# Patient Record
Sex: FEMALE | Race: BLACK OR AFRICAN AMERICAN | NOT HISPANIC OR LATINO | Employment: FULL TIME | URBAN - METROPOLITAN AREA
[De-identification: names, ages, dates, MRNs, and addresses within clinical notes are randomized per-mention and may not be internally consistent; named-entity substitution may affect disease eponyms.]

---

## 2017-01-24 ENCOUNTER — OFFICE VISIT (OUTPATIENT)
Dept: PSYCHIATRY | Facility: CLINIC | Age: 22
End: 2017-01-24
Payer: COMMERCIAL

## 2017-01-24 ENCOUNTER — OFFICE VISIT (OUTPATIENT)
Dept: FAMILY MEDICINE | Facility: CLINIC | Age: 22
End: 2017-01-24
Payer: MEDICAID

## 2017-01-24 VITALS
SYSTOLIC BLOOD PRESSURE: 110 MMHG | OXYGEN SATURATION: 95 % | TEMPERATURE: 98 F | DIASTOLIC BLOOD PRESSURE: 88 MMHG | HEART RATE: 77 BPM | HEIGHT: 68 IN | WEIGHT: 182.56 LBS | BODY MASS INDEX: 27.67 KG/M2

## 2017-01-24 DIAGNOSIS — F41.9 ANXIETY DISORDER, UNSPECIFIED TYPE: ICD-10-CM

## 2017-01-24 DIAGNOSIS — F32.2 MAJOR DEPRESSIVE DISORDER, SEVERE: Primary | ICD-10-CM

## 2017-01-24 DIAGNOSIS — F32.9 SINGLE CURRENT EPISODE OF MAJOR DEPRESSIVE DISORDER, UNSPECIFIED DEPRESSION EPISODE SEVERITY: ICD-10-CM

## 2017-01-24 PROCEDURE — 99213 OFFICE O/P EST LOW 20 MIN: CPT | Mod: PBBFAC,PO | Performed by: FAMILY MEDICINE

## 2017-01-24 PROCEDURE — 99999 PR PBB SHADOW E&M-EST. PATIENT-LVL III: CPT | Mod: PBBFAC,,, | Performed by: FAMILY MEDICINE

## 2017-01-24 PROCEDURE — 90834 PSYTX W PT 45 MINUTES: CPT | Mod: S$GLB,,, | Performed by: SOCIAL WORKER

## 2017-01-24 PROCEDURE — 99214 OFFICE O/P EST MOD 30 MIN: CPT | Mod: S$PBB,,, | Performed by: FAMILY MEDICINE

## 2017-01-24 RX ORDER — PAROXETINE HYDROCHLORIDE 20 MG/1
20 TABLET, FILM COATED ORAL EVERY MORNING
Qty: 90 TABLET | Refills: 5 | Status: SHIPPED | OUTPATIENT
Start: 2017-01-24 | End: 2017-07-18

## 2017-01-24 NOTE — PROGRESS NOTES
Individual Psychotherapy (PhD/LCSW)    1/24/2017    Site:  [facility]         Therapeutic Intervention: Met with patient.  Outpatient - Behavior modifying psychotherapy 45 min - CPT code 63551 and Outpatient - Supportive psychotherapy 45 min - CPT Code 86766    Chief complaint/reason for encounter: depression, anxiety and sleep     Interval history and content of current session: Patient returned to clinic for follow-up psychotherapy. Last seen 11/16. Said that she had difficulty scheduling at a time when she was available. Reported that she has been doing better. Said that she has not had any negative thoughts since she started the medication. Family has noticed a decrease in agitation. Said that there are occasional days when she doesn't want to do anything. Expressed feeling more joyful. Said that she has been talking more at work and doing social activities. Identified increased anxiety when things happen that are out of her control. Overall, feels less anxious and irritable. Said that she is not thinking as much about what others think. Identified that she doesn't think of herself in a negative or positive way. Had difficulty identifying positive traits. Said that she has been walking her dog, meditating, and reading. Identified some sleep disturbances. Identified goal of increasing motivation and having better fitness. Identified that she doesn't want to stay on medication due to stigma. Discussed sleep hygiene. Discussed goal setting. Discussed benefits of medication and developing coping skills before d/c medications. Discussed transition and introduced new therapist.     Treatment plan:  · Target symptoms: depression, anxiety   · Why chosen therapy is appropriate versus another modality: relevant to diagnosis, evidence based practice  · Outcome monitoring methods: self-report, observation  · Therapeutic intervention type: behavior modifying psychotherapy, supportive psychotherapy    Risk parameters:  Patient  reports no suicidal ideation  Patient reports no homicidal ideation  Patient reports no self-injurious behavior  Patient reports no violent behavior    Verbal deficits: None    Patient's response to intervention:  The patient's response to intervention is accepting.    Progress toward goals and other mental status changes:  The patient's progress toward goals is fair .    Diagnosis:     ICD-10-CM ICD-9-CM   1. Major depressive disorder, severe F32.2 296.23   2. Anxiety disorder, unspecified type F41.9 300.00       Plan:  individual psychotherapy and medication management by physician    Return to clinic: 2 weeks    Length of Service (minutes): 45     MIRIAM ButlerW-BACS

## 2017-01-24 NOTE — PROGRESS NOTES
[facility]  Progress Note    SUBJECTIVE:     Chief Complaint   Patient presents with    Depression     follow up       HPI   Milka Smith  is a 21 y.o. female here for follow up of depression. She recently had a visit with Whitley, which she is doing much much better. States having much more energy, feels more enthusiastic, overall better generalized feeling. No other problems or side effects.    Review of patient's allergies indicates:  No Known Allergies    Past Medical History   Diagnosis Date    Depression     Overweight (BMI 25.0-29.9)      Past Surgical History   Procedure Laterality Date    No past surgeries       Family History   Problem Relation Age of Onset    Colon cancer Father 42    Hypertension Father     Diabetes Paternal Grandmother     No Known Problems Mother     Deep vein thrombosis Maternal Grandmother     Cancer Maternal Grandmother     Heart disease Paternal Grandfather     Hypertension Paternal Grandfather     Breast cancer       maternal great aunt     Social History   Substance Use Topics    Smoking status: Never Smoker    Smokeless tobacco: Never Used    Alcohol use No        Review of Systems   Constitutional: Negative for chills, fever and malaise/fatigue.   HENT: Negative.    Respiratory: Negative.  Negative for cough and shortness of breath.    Cardiovascular: Negative.  Negative for chest pain.   Gastrointestinal: Negative.  Negative for abdominal pain, nausea and vomiting.   Genitourinary: Negative.    Neurological: Negative for weakness and headaches.   All other systems reviewed and are negative.    OBJECTIVE:     Vitals:    01/24/17 1453   BP: 110/88   Pulse: 77   Temp: 98 °F (36.7 °C)     Body mass index is 27.76 kg/(m^2).    Physical Exam   Constitutional: She is oriented to person, place, and time and well-developed, well-nourished, and in no distress. No distress.   HENT:   Head: Normocephalic and atraumatic.   Eyes: Conjunctivae and EOM are  normal.   Pulmonary/Chest: Effort normal.   Neurological: She is alert and oriented to person, place, and time.   Skin: She is not diaphoretic.       Old records were reviewed. Labs and/or images were independently reviewed.    ASSESSMENT     1. Single current episode of major depressive disorder, unspecified depression episode severity        PLAN:     Single current episode of major depressive disorder, unspecified depression episode severity  -     paroxetine (PAXIL) 20 MG tablet; Take 1 tablet (20 mg total) by mouth every morning.  Dispense: 90 tablet; Refill: 5  -     Doing extremely well. Continue current regimen. Advised that we should not discontinue at this time, and she agrees.    RTC PRN    More than 50% of the encounter was spent counseling patient about depression, in an outpatient setting. Total time spent counseling patient: 25.    Cuate Parker MD  01/24/2017 3:04 PM

## 2017-04-27 ENCOUNTER — HOSPITAL ENCOUNTER (EMERGENCY)
Facility: HOSPITAL | Age: 22
Discharge: HOME | End: 2017-04-27
Attending: EMERGENCY MEDICINE
Payer: COMMERCIAL

## 2017-04-27 VITALS
RESPIRATION RATE: 18 BRPM | WEIGHT: 170 LBS | DIASTOLIC BLOOD PRESSURE: 71 MMHG | BODY MASS INDEX: 25.76 KG/M2 | TEMPERATURE: 99 F | HEIGHT: 68 IN | SYSTOLIC BLOOD PRESSURE: 118 MMHG | HEART RATE: 94 BPM | OXYGEN SATURATION: 99 %

## 2017-04-27 DIAGNOSIS — E86.0 DEHYDRATION: Primary | ICD-10-CM

## 2017-04-27 DIAGNOSIS — R11.2 NON-INTRACTABLE VOMITING WITH NAUSEA, UNSPECIFIED VOMITING TYPE: ICD-10-CM

## 2017-04-27 DIAGNOSIS — R19.7 DIARRHEA, UNSPECIFIED TYPE: ICD-10-CM

## 2017-04-27 LAB
ALBUMIN SERPL BCP-MCNC: 4.3 G/DL
ALP SERPL-CCNC: 81 U/L
ALT SERPL W/O P-5'-P-CCNC: 68 U/L
AMORPH CRY URNS QL MICRO: ABNORMAL
AMPHET+METHAMPHET UR QL: NEGATIVE
ANION GAP SERPL CALC-SCNC: 15 MMOL/L
AST SERPL-CCNC: 48 U/L
BACTERIA #/AREA URNS HPF: ABNORMAL /HPF
BARBITURATES UR QL SCN>200 NG/ML: NEGATIVE
BASOPHILS # BLD AUTO: 0.02 K/UL
BASOPHILS NFR BLD: 0.2 %
BENZODIAZ UR QL SCN>200 NG/ML: NEGATIVE
BILIRUB SERPL-MCNC: 0.5 MG/DL
BILIRUB UR QL STRIP: NEGATIVE
BUN SERPL-MCNC: 23 MG/DL
BZE UR QL SCN: NEGATIVE
CALCIUM SERPL-MCNC: 10.3 MG/DL
CANNABINOIDS UR QL SCN: ABNORMAL
CHLORIDE SERPL-SCNC: 103 MMOL/L
CLARITY UR: ABNORMAL
CO2 SERPL-SCNC: 19 MMOL/L
COLOR UR: ABNORMAL
CREAT SERPL-MCNC: 1.1 MG/DL
CREAT UR-MCNC: 425.3 MG/DL
DIFFERENTIAL METHOD: ABNORMAL
EOSINOPHIL # BLD AUTO: 0 K/UL
EOSINOPHIL NFR BLD: 0.1 %
ERYTHROCYTE [DISTWIDTH] IN BLOOD BY AUTOMATED COUNT: 14.3 %
EST. GFR  (AFRICAN AMERICAN): >60 ML/MIN/1.73 M^2
EST. GFR  (NON AFRICAN AMERICAN): >60 ML/MIN/1.73 M^2
ETHANOL SERPL-MCNC: <10 MG/DL
GLUCOSE SERPL-MCNC: 124 MG/DL
GLUCOSE UR QL STRIP: ABNORMAL
HCT VFR BLD AUTO: 45.6 %
HGB BLD-MCNC: 15.7 G/DL
HGB UR QL STRIP: NEGATIVE
HYALINE CASTS #/AREA URNS LPF: 5 /LPF
KETONES UR QL STRIP: ABNORMAL
LACTATE SERPL-SCNC: 2.5 MMOL/L
LEUKOCYTE ESTERASE UR QL STRIP: NEGATIVE
LIPASE SERPL-CCNC: 13 U/L
LYMPHOCYTES # BLD AUTO: 0.2 K/UL
LYMPHOCYTES NFR BLD: 2.6 %
MCH RBC QN AUTO: 30.5 PG
MCHC RBC AUTO-ENTMCNC: 34.4 %
MCV RBC AUTO: 89 FL
METHADONE UR QL SCN>300 NG/ML: NEGATIVE
MICROSCOPIC COMMENT: ABNORMAL
MONOCYTES # BLD AUTO: 1.1 K/UL
MONOCYTES NFR BLD: 12.4 %
NEUTROPHILS # BLD AUTO: 7.5 K/UL
NEUTROPHILS NFR BLD: 84.4 %
NITRITE UR QL STRIP: NEGATIVE
OPIATES UR QL SCN: NEGATIVE
PCP UR QL SCN>25 NG/ML: NEGATIVE
PH UR STRIP: 5 [PH] (ref 5–8)
PLATELET # BLD AUTO: 264 K/UL
PMV BLD AUTO: 10.5 FL
POTASSIUM SERPL-SCNC: 4.7 MMOL/L
PROT SERPL-MCNC: 9.4 G/DL
PROT UR QL STRIP: ABNORMAL
RBC # BLD AUTO: 5.15 M/UL
RBC #/AREA URNS HPF: 2 /HPF (ref 0–4)
SODIUM SERPL-SCNC: 137 MMOL/L
SP GR UR STRIP: >1.03 (ref 1–1.03)
SQUAMOUS #/AREA URNS HPF: 3 /HPF
TOXICOLOGY INFORMATION: ABNORMAL
URN SPEC COLLECT METH UR: ABNORMAL
UROBILINOGEN UR STRIP-ACNC: NEGATIVE EU/DL
WBC # BLD AUTO: 8.84 K/UL
WBC #/AREA URNS HPF: 1 /HPF (ref 0–5)

## 2017-04-27 PROCEDURE — 87209 SMEAR COMPLEX STAIN: CPT

## 2017-04-27 PROCEDURE — 87046 STOOL CULTR AEROBIC BACT EA: CPT | Mod: 59

## 2017-04-27 PROCEDURE — 87045 FECES CULTURE AEROBIC BACT: CPT

## 2017-04-27 PROCEDURE — 96374 THER/PROPH/DIAG INJ IV PUSH: CPT

## 2017-04-27 PROCEDURE — 82570 ASSAY OF URINE CREATININE: CPT

## 2017-04-27 PROCEDURE — 85025 COMPLETE CBC W/AUTO DIFF WBC: CPT

## 2017-04-27 PROCEDURE — 87427 SHIGA-LIKE TOXIN AG IA: CPT | Mod: 59

## 2017-04-27 PROCEDURE — 81000 URINALYSIS NONAUTO W/SCOPE: CPT

## 2017-04-27 PROCEDURE — 63600175 PHARM REV CODE 636 W HCPCS: Performed by: EMERGENCY MEDICINE

## 2017-04-27 PROCEDURE — 80320 DRUG SCREEN QUANTALCOHOLS: CPT

## 2017-04-27 PROCEDURE — 25000003 PHARM REV CODE 250: Performed by: EMERGENCY MEDICINE

## 2017-04-27 PROCEDURE — 83690 ASSAY OF LIPASE: CPT

## 2017-04-27 PROCEDURE — 80053 COMPREHEN METABOLIC PANEL: CPT

## 2017-04-27 PROCEDURE — 99283 EMERGENCY DEPT VISIT LOW MDM: CPT | Mod: 25

## 2017-04-27 PROCEDURE — 96361 HYDRATE IV INFUSION ADD-ON: CPT

## 2017-04-27 PROCEDURE — 87186 SC STD MICRODIL/AGAR DIL: CPT

## 2017-04-27 PROCEDURE — 80307 DRUG TEST PRSMV CHEM ANLYZR: CPT

## 2017-04-27 PROCEDURE — 83605 ASSAY OF LACTIC ACID: CPT

## 2017-04-27 PROCEDURE — 87077 CULTURE AEROBIC IDENTIFY: CPT

## 2017-04-27 RX ORDER — ONDANSETRON 2 MG/ML
8 INJECTION INTRAMUSCULAR; INTRAVENOUS
Status: COMPLETED | OUTPATIENT
Start: 2017-04-27 | End: 2017-04-27

## 2017-04-27 RX ORDER — ONDANSETRON 4 MG/1
4 TABLET, ORALLY DISINTEGRATING ORAL EVERY 12 HOURS PRN
Qty: 12 TABLET | Refills: 0 | Status: SHIPPED | OUTPATIENT
Start: 2017-04-27 | End: 2017-04-30

## 2017-04-27 RX ORDER — DICYCLOMINE HYDROCHLORIDE 20 MG/1
20 TABLET ORAL 2 TIMES DAILY
Qty: 20 TABLET | Refills: 0 | Status: SHIPPED | OUTPATIENT
Start: 2017-04-27 | End: 2017-05-27

## 2017-04-27 RX ADMIN — SODIUM CHLORIDE 2000 ML: 0.9 INJECTION, SOLUTION INTRAVENOUS at 05:04

## 2017-04-27 RX ADMIN — ONDANSETRON 8 MG: 2 INJECTION INTRAMUSCULAR; INTRAVENOUS at 05:04

## 2017-04-27 NOTE — ED AVS SNAPSHOT
[facility]  [place] [address]               Milka Smith   2017  4:30 PM   ED    Description:  Female : 1995   Department:  [facility]           Your Care was Coordinated By:     Provider Role From To    Duran Frederick MD Attending Provider [id number] --      Reason for Visit     Emesis           Diagnoses this Visit        Comments    Dehydration    -  Primary     Food poisoning, accidental or unintentional, initial encounter         Diarrhea, unspecified type         Non-intractable vomiting with nausea, unspecified vomiting type           ED Disposition     ED Disposition Condition Comment    Discharge             To Do List           Follow-up Information     Schedule an appointment as soon as possible for a visit with Jessica Winter MD.    Specialties:  Internal Medicine, Pediatrics    Contact information:    [address] [facility] [address] [phone]          Follow up with [facility].    Specialty:  Emergency Medicine    Why:  As needed, If symptoms worsen    Contact information:    [place] [address] [phone]       These Medications        Disp Refills Start End    dicyclomine (BENTYL) 20 mg tablet 20 tablet 0 2017    Take 1 tablet (20 mg total) by mouth 2 (two) times daily. - Oral    Pharmacy: [address] Ph #: [phone]       ondansetron (ZOFRAN-ODT) 4 MG TbDL 12 tablet 0 2017    Take 1 tablet (4 mg total) by mouth every 12 (twelve) hours as needed. - Oral    Pharmacy: [address] Ph #: [phone]         [facility]     [facility] Nurse Care Line -  Assistance  Unless otherwise directed by your provider, please contact [facility], our nurse care line that is available  for 24/7 assistance.     Registered nurses in the [facility] provide: appointment scheduling, clinical advisement, health education, and other advisory services.  Call: [phone] (toll free)               Medications           Message regarding Medications     Verify the changes and/or additions to your medication regime listed below are the same as discussed with your clinician today.  If any of these changes or additions are incorrect, please notify your healthcare provider.        START taking these NEW medications        Refills    dicyclomine (BENTYL) 20 mg tablet 0    Sig: Take 1 tablet (20 mg total) by mouth 2 (two) times daily.    Class: Print    Route: Oral    ondansetron (ZOFRAN-ODT) 4 MG TbDL 0    Sig: Take 1 tablet (4 mg total) by mouth every 12 (twelve) hours as needed.    Class: Print    Route: Oral      These medications were administered today        Dose Freq    sodium chloride 0.9% bolus 2,000 mL 2,000 mL ED 1 Time    Sig: Inject 2,000 mLs into the vein ED 1 Time.    Class: Normal    Route: Intravenous    ondansetron injection 8 mg 8 mg ED 1 Time    Sig: Inject 8 mg into the vein ED 1 Time.    Class: Normal    Route: Intravenous           Verify that the below list of medications is an accurate representation of the medications you are currently taking.  If none reported, the list may be blank. If incorrect, please contact your healthcare provider. Carry this list with you in case of emergency.           Current Medications     paroxetine (PAXIL) 20 MG tablet Take 1 tablet (20 mg total) by mouth every morning.    dicyclomine (BENTYL) 20 mg tablet Take 1 tablet (20 mg total) by mouth 2 (two) times daily.    norethindrone-e.estradiol-iron (LOESTRIN 24 FE) 1 mg-20 mcg (24)/75 mg (4) Oral per tablet Take 1 tablet by mouth once daily.    ondansetron (ZOFRAN-ODT) 4 MG TbDL Take 1 tablet (4 mg total) by mouth every 12 (twelve) hours as needed.           Clinical Reference Information     "       Your Vitals Were     BP Pulse Temp Resp Height Weight    117/73 96 98.5 °F (36.9 °C) (Oral) 16 5' 8" (1.727 m) 77.1 kg (170 lb)    Last Period SpO2 BMI          03/20/2017 98% 25.85 kg/m2        Allergies as of 4/27/2017     No Known Allergies      Immunizations Administered on Date of Encounter - 4/27/2017     None      ED Micro, Lab, POCT     Start Ordered       Status Ordering Provider    04/27/17 1910 04/27/17 1909  Stool Exam-Ova,Cysts,Parasites  Once      Ordered     04/27/17 1704 04/27/17 1703  Stool culture **CANNOT BE ORDERED STAT**  Once      In process     04/27/17 1703 04/27/17 1703  E. coli 0157 antigen  Once      In process     04/27/17 1638 04/27/17 1637  CBC auto differential  Once      Final result     04/27/17 1638 04/27/17 1637  Comprehensive metabolic panel  Once      Final result     04/27/17 1638 04/27/17 1637  Lipase  STAT      Final result     04/27/17 1638 04/27/17 1637  Urinalysis  Once      Final result     04/27/17 1638 04/27/17 1637  Lactic acid, plasma  STAT      Final result     04/27/17 1638 04/27/17 1637  Ethanol  Once      Final result     04/27/17 1638 04/27/17 1637  Drug screen panel, emergency  STAT      Final result     04/27/17 1638 04/27/17 1637  POCT urine pregnancy  Once      Acknowledged     04/27/17 1637 04/27/17 1637  Urinalysis Microscopic  Once      Final result       ED Imaging Orders     None        Discharge Instructions           Uncertain Causes of Diarrhea (Adult)    Diarrhea is when stools are loose and watery. This can be caused by:  · Viral infections  · Bacterial infections  · Food poisoning  · Parasites  · Irritable bowel syndrome (IBS)  · Inflammatory bowel diseases such as ulcerative colitis, Crohn's disease, and celiac disease  · Food intolerance, such as to lactose, the sugar found in milk and milk products  · Reaction to medicines like antibiotics, laxatives, cancer drugs, and antacids  Along with diarrhea, you may also have:  · Abdominal pain and " cramping  · Nausea and vomiting  · Loss of bowel control  · Fever and chills  · Bloody stools  In some cases, antibiotics may help to treat diarrhea. You may have a stool sample test. This is done to see what is causing your diarrhea, and if antibiotics will help treat it. The results of a stool sample test may take up to 2 days. The healthcare provider may not give you antibiotics until he or she has the stool test results.  Diarrhea can cause dehydration. This is the loss of too much water and other fluids from the body. When this occurs, body fluid must be replaced. This can be done with oral rehydration solutions. Oral rehydration solutions are available at drugstores and grocery stores without a prescription.  Home care  Follow all instructions given by your healthcare provider. Rest at home for the next 24 hours, or until you feel better. Avoid caffeine, tobacco, and alcohol. These can make diarrhea, cramping, and pain worse.  If taking medicines:  · Dont take over-the-counter diarrhea or nausea medicines unless your healthcare provider tells you to.  · You may use acetaminophen or NSAID medicines like ibuprofen or naproxen to reduce pain and fever. Dont use these if you have chronic liver or kidney disease, or ever had a stomach ulcer or gastrointestinal bleeding. Don't use NSAID medicines if you are already taking one for another condition (like arthritis) or are on daily aspirin therapy (such as for heart disease or after a stroke). Talk with your healthcare provider first.  · If antibiotics were prescribed, be sure you take them until they are finished. Dont stop taking them even when you feel better. Antibiotics must be taken as a full course.  To prevent the spread of illness:  · Remember that washing with soap and water and using alcohol-based  is the best way to prevent the spread of infection.  · Clean the toilet after each use.  · Wash your hands before eating.  · Wash your hands before  and after preparing food. Keep in mind that people with diarrhea or vomiting should not prepare food for others.  · Wash your hands after using cutting boards, countertops, and knives that have been in contact with raw foods.  · Wash and then peel fruits and vegetables.  · Keep uncooked meats away from cooked and ready-to-eat foods.  · Use a food thermometer when cooking. Cook poultry to at least 165°F (74°C). Cook ground meat (beef, veal, pork, lamb) to at least 160°F (71°C). Cook fresh beef, veal, lamb, and pork to at least 145°F (63°C).  · Dont eat raw or undercooked eggs (poached or merritt side up), poultry, meat, or unpasteurized milk and juices.  Food and drinks  The main goal while treating vomiting or diarrhea is to prevent dehydration. This is done by taking small amounts of liquids often.  · Keep in mind that liquids are more important than food right now.  · Drink only small amounts of liquids at a time.  · Dont force yourself to eat, especially if you are having cramping, vomiting, or diarrhea. Dont eat large amounts at a time, even if you are hungry.  · If you eat, avoid fatty, greasy, spicy, or fried foods.  · Dont eat dairy foods or drink milk if you have diarrhea. These can make diarrhea worse.  During the first 24 hours you can try:  · Oral rehydration solutions. Do not use sports drinks. They have too much sugar and not enough electrolytes.  · Soft drinks without caffeine  · Ginger ale  · Water (plain or flavored)  · Decaf tea or coffee  · Clear broth, consommé, or bouillon  · Gelatin, popsicles, or frozen fruit juice bars  The second 24 hours, if you are feeling better, you can add:  · Hot cereal, plain toast, bread, rolls, or crackers  · Plain noodles, rice, mashed potatoes, chicken noodle soup, or rice soup  · Unsweetened canned fruit (no pineapple)  · Bananas  As you recover:  · Limit fat intake to less than 15 grams per day. Dont eat margarine, butter, oils, mayonnaise, sauces, gravies,  fried foods, peanut butter, meat, poultry, or fish.  · Limit fiber. Dont eat raw or cooked vegetables, fresh fruits except bananas, or bran cereals.  · Limit caffeine and chocolate.  · Limit dairy.  · Dont use spices or seasonings except salt.  · Go back to your normal diet over time, as you feel better and your symptoms improve.  · If the symptoms come back, go back to a simple diet or clear liquids.  Follow-up care  Follow up with your healthcare provider, or as advised. If a stool sample was taken or cultures were done, call the healthcare provider for the results as instructed.  Call 911  Call 911 if you have any of these symptoms:  · Trouble breathing  · Confusion  · Extreme drowsiness or trouble walking  · Loss of consciousness  · Rapid heart rate  · Chest pain  · Stiff neck  · Seizure  When to seek medical advice  Call your healthcare provider right away if any of these occur:  · Abdominal pain that gets worse  · Constant lower right abdominal pain  · Continued vomiting and inability to keep liquids down  · Diarrhea more than 5 times a day  · Blood in vomit or stool  · Dark urine or no urine for 8 hours, dry mouth and tongue, tiredness, weakness, or dizziness  · Drowsiness  · New rash  · You dont get better in 2 to 3 days  · Fever of 100.4°F (38°C) or higher that doesnt get lower with medicine  Date Last Reviewed: 1/3/2016  © 0844-1371 [employer]. [address] [place] [address]. All rights reserved. This information is not intended as a substitute for professional medical care. Always follow your healthcare professional's instructions.        Dehydration (Adult)  Dehydration occurs when your body loses too much fluid. This may be the result of prolonged vomiting or diarrhea, excessive sweating, or a high fever. It may also happen if you dont drink enough fluid when youre sick or out in the heat. Misuse of diuretics (water pills) can also be a cause.  Symptoms include thirst and  decreased urine output. You may also feel dizzy, weak, fatigued, or very drowsy. The diet described below is usually enough to treat dehydration. In some cases, you may need medicine.  Home care  · Drink at least 12 8-ounce glasses of fluid every day to resolve the dehydration. Fluid may include water; orange juice; lemonade; apple, grape, or cranberry juice; clear fruit drinks; electrolyte replacement and sports drinks; and teas and coffee without caffeine. If you have been diagnosed with a kidney disease, ask your doctor how much and what types of fluids you should drink to prevent dehydration. If you have kidney disease, fluid can build up in the body. This can be dangerous to your health.  · If you have a fever, muscle aches, or a headache as a result of a cold or flu, you may take acetaminophen or ibuprofen, unless another medicine was prescribed. If you have chronic liver or kidney disease, or have ever had a stomach ulcer or gastrointestinal bleeding, talk with your health care provider before using these medicines. Don't take aspirin if you are younger than 18 and have a fever. Aspirin raises the chance for severe liver injury.  Follow-up care  Follow up with your health care provider, or as advised.  When to seek medical advice  Call your health care provider right away if any of these occur:  · Continued vomiting  · Frequent diarrhea (more than 5 times a day); blood (red or black color) or mucus in diarrhea  · Blood in vomit or stool  · Swollen abdomen or increasing abdominal pain  · Weakness, dizziness, or fainting  · Unusual drowsiness or confusion  · Reduced urine output or extreme thirst  · Fever of 100.4°F (34°C) or higher  Date Last Reviewed: 5/31/2015 © 2000-2016 [employer]. [address] [place] [address], [place] [address]. All rights reserved. This information is not intended as a substitute for professional medical care. Always follow your healthcare professional's instructions.            [facility] complies with applicable Federal civil rights laws and does not discriminate on the basis of race, color, national origin, age, disability, or sex.        Language Assistance Services     ATTENTION: Language assistance services are available, free of charge. Please call [phone].      ATENCIÓN: Si habla español, tiene a schwartz disposición servicios gratuitos de asistencia lingüística. Llame al [phone].     CHÚ Ý: N?u b?n nói Ti?ng Vi?t, có các d?ch v? h? tr? ngôn ng? mi?n phí dành cho b?n. G?i s? [phone].

## 2017-04-27 NOTE — ED PROVIDER NOTES
Encounter Date: 4/27/2017    SCRIBE #1 NOTE: I, Wendy Zuluaga, am scribing for, and in the presence of,  Duran Frederick MD. I have scribed the following portions of the note - Other sections scribed: HPI/ROS.       History     Chief Complaint   Patient presents with    Emesis     pt c/o abdominal pain nvd starting at 10:00 pm yesterday.       Review of patient's allergies indicates:  No Known Allergies  HPI Comments: CC: Emesis    HPI: This 21 y.o. F who has no significant PMHx presents to the ED for evaluation of acute onset emesis with associated intermittent moderate periumbilical pain, nausea and diarrhea since 2200 last night. The pt notes that she went camping 1 week ago for 3 days. A friend who was with her on the trip is experiencing similar symptoms. The pt states that they ate hot dogs during the trip after the ice in the cooler melted. She notes that the hot dogs smelled fine at the time. She also cleaned the utensils that they used with spring water. The pt reports feeling nauseous briefly after eating fried shrimp 2 days ago. She denies fever, chills, blood in stool, dysuria, vaginal discharge, and any other associated symptoms.     The history is provided by the patient. No  was used.     Past Medical History:   Diagnosis Date    Depression     Overweight (BMI 25.0-29.9)      Past Surgical History:   Procedure Laterality Date    NO PAST SURGERIES       Family History   Problem Relation Age of Onset    Colon cancer Father 42    Hypertension Father     Diabetes Paternal Grandmother     No Known Problems Mother     Deep vein thrombosis Maternal Grandmother     Cancer Maternal Grandmother     Heart disease Paternal Grandfather     Hypertension Paternal Grandfather     Breast cancer       maternal great aunt     Social History   Substance Use Topics    Smoking status: Never Smoker    Smokeless tobacco: Never Used    Alcohol use Yes      Comment: Socially drinks  beer     Review of Systems   Constitutional: Positive for fatigue. Negative for chills and fever.   HENT: Negative for sore throat.    Eyes: Negative for visual disturbance.   Respiratory: Negative for shortness of breath.    Cardiovascular: Negative for chest pain.   Gastrointestinal: Positive for abdominal pain (periumbilical), diarrhea, nausea and vomiting. Negative for blood in stool.   Genitourinary: Negative for dysuria and vaginal discharge.   Musculoskeletal: Negative for back pain.   Skin: Negative for rash.   Neurological: Negative for headaches.       Physical Exam   Initial Vitals   BP Pulse Resp Temp SpO2   04/27/17 1628 04/27/17 1628 04/27/17 1628 04/27/17 1628 04/27/17 1628   118/64 157 20 98.8 °F (37.1 °C) 99 %     Physical Exam    Vitals reviewed.  Constitutional: She appears well-developed and well-nourished.   HENT:   Head: Normocephalic and atraumatic.   Nose: Nose normal.   Mouth/Throat: No oropharyngeal exudate.   Eyes: EOM are normal. Pupils are equal, round, and reactive to light.   Neck: Normal range of motion. Neck supple. No JVD present.   Cardiovascular: Regular rhythm and normal heart sounds. Exam reveals no gallop and no friction rub.    No murmur heard.  Pulmonary/Chest: Breath sounds normal. No stridor. No respiratory distress. She has no wheezes. She has no rhonchi. She has no rales. She exhibits no tenderness.   Abdominal: Soft. Bowel sounds are normal. She exhibits no distension and no mass. There is no tenderness. There is no rebound and no guarding.   Musculoskeletal: Normal range of motion. She exhibits no edema or tenderness.   Neurological: She is alert and oriented to person, place, and time. She has normal strength. No sensory deficit.   Skin: Skin is warm and dry.   Psychiatric: She has a normal mood and affect. Thought content normal.         ED Course   Procedures  Labs Reviewed   CBC W/ AUTO DIFFERENTIAL - Abnormal; Notable for the following:        Result Value    Lymph  # 0.2 (*)     Mono # 1.1 (*)     Gran% 84.4 (*)     Lymph% 2.6 (*)     All other components within normal limits   COMPREHENSIVE METABOLIC PANEL - Abnormal; Notable for the following:     CO2 19 (*)     Glucose 124 (*)     BUN, Bld 23 (*)     Total Protein 9.4 (*)     AST 48 (*)     ALT 68 (*)     All other components within normal limits   URINALYSIS - Abnormal; Notable for the following:     Appearance, UA Cloudy (*)     Specific Gravity, UA >1.030 (*)     Protein, UA 2+ (*)     Glucose, UA 1+ (*)     Ketones, UA 1+ (*)     All other components within normal limits   LACTIC ACID, PLASMA - Abnormal; Notable for the following:     Lactate (Lactic Acid) 2.5 (*)     All other components within normal limits   DRUG SCREEN PANEL, URINE EMERGENCY - Abnormal; Notable for the following:     Creatinine, Random Ur 425.3 (*)     All other components within normal limits   URINALYSIS MICROSCOPIC - Abnormal; Notable for the following:     Hyaline Casts, UA 5 (*)     All other components within normal limits   CULTURE, STOOL   ENTEROHEMORRHAGIC E.COLI   LIPASE   ALCOHOL,MEDICAL (ETHANOL)   STOOL EXAM-OVA,CYSTS,PARASITES       Medical decision-making:    The patient received a medical screening exam. If performed, the EKG was independently evaluated by me and is pending final cardiology evaluation.  If performed, all radiographic studies were independently evaluated by me and are pending final radiology evaluation. If labs were ordered, they were reviewed. Vital signs are independently assessed by me.  If performed, the pulse oximetry was independently evaluated by me.  I decided to obtain the patient's past medical record.  If available, I reviewed the patient's past medical record, including most recent labs and radiology reports.    DDX: dehydration, food poisoning, Giardia, bacterial diarrhea, Salmonella, Campylobacter, UTI  Urinalysis does not reveal any acute sign of infection.  Elevated specific gravity, suggestive of  dehydration.  Prerenal azotemia, also suggestive of dehydration.  Patient was given IV fluids in the emergency department.  The patient's tachycardia dramatically improved after infusion of IV fluids.  Patient is tolerating by mouth.  There has been no nausea or vomiting while in the emergency department.  Patient was able to provide a stool sample.  Stool cultures pending.  No fever or leukocytosis at this time.  Will defer antibiotics at this time until definitive stool cultures return.  I consider but doubt acute infectious bacterial diarrhea at this time.  No sign of pancreatitis.  Lactate mildly elevated.  Patient's abdomen is soft and nontender.  I doubt acute mesenteric ischemia.  Drug and alcohol screen normal.  Patient states that she is feeling 100% better after IV fluids and on reexamination of the abdomen.  It remained soft and nontender.  Patient states that she is ready for discharge.  We'll provide symptomatic support and refer to primary care.  Return precautions given.  Based upon current findings.  I doubt acute appendicitis and cholecystitis.  There is no right upper quadrant or right lower quadrant tenderness.    The results and physical exam findings were reviewed with the patient. Pt agrees with assessment, disposition and treatment plan and has no further questions or complaints at this time.    ALEX Frederick M.D. 6:46 AM 5/2/2017                    Scribe Attestation:   Scribe #1: I performed the above scribed service and the documentation accurately describes the services I performed. I attest to the accuracy of the note.    Attending Attestation:           Physician Attestation for Scribe:  Physician Attestation Statement for Scribe #1: I, Duran Frederick MD, reviewed documentation, as scribed by Wendy Zuluaga in my presence, and it is both accurate and complete.                 ED Course     Clinical Impression:   The primary encounter diagnosis was Dehydration. Diagnoses of  Food poisoning, accidental or unintentional, initial encounter, Diarrhea, unspecified type, and Non-intractable vomiting with nausea, unspecified vomiting type were also pertinent to this visit.          Duran Frederick MD  05/02/17 0691

## 2017-04-27 NOTE — ED TRIAGE NOTES
Pt c/o vomiting and diarrhea since 2200 last night.  Pt states she did eat shrimp two days ago.  Pt states she did go camping last week.

## 2017-04-27 NOTE — ED NOTES
Pt instructed about plan of care and need for urine and stool sample. Call light within reach. Family at bedside.

## 2017-04-28 LAB
E COLI SXT1 STL QL IA: NEGATIVE
E COLI SXT2 STL QL IA: NEGATIVE

## 2017-04-28 NOTE — DISCHARGE INSTRUCTIONS
Uncertain Causes of Diarrhea (Adult)    Diarrhea is when stools are loose and watery. This can be caused by:  · Viral infections  · Bacterial infections  · Food poisoning  · Parasites  · Irritable bowel syndrome (IBS)  · Inflammatory bowel diseases such as ulcerative colitis, Crohn's disease, and celiac disease  · Food intolerance, such as to lactose, the sugar found in milk and milk products  · Reaction to medicines like antibiotics, laxatives, cancer drugs, and antacids  Along with diarrhea, you may also have:  · Abdominal pain and cramping  · Nausea and vomiting  · Loss of bowel control  · Fever and chills  · Bloody stools  In some cases, antibiotics may help to treat diarrhea. You may have a stool sample test. This is done to see what is causing your diarrhea, and if antibiotics will help treat it. The results of a stool sample test may take up to 2 days. The healthcare provider may not give you antibiotics until he or she has the stool test results.  Diarrhea can cause dehydration. This is the loss of too much water and other fluids from the body. When this occurs, body fluid must be replaced. This can be done with oral rehydration solutions. Oral rehydration solutions are available at drugstores and grocery stores without a prescription.  Home care  Follow all instructions given by your healthcare provider. Rest at home for the next 24 hours, or until you feel better. Avoid caffeine, tobacco, and alcohol. These can make diarrhea, cramping, and pain worse.  If taking medicines:  · Dont take over-the-counter diarrhea or nausea medicines unless your healthcare provider tells you to.  · You may use acetaminophen or NSAID medicines like ibuprofen or naproxen to reduce pain and fever. Dont use these if you have chronic liver or kidney disease, or ever had a stomach ulcer or gastrointestinal bleeding. Don't use NSAID medicines if you are already taking one for another condition (like arthritis) or are on daily  aspirin therapy (such as for heart disease or after a stroke). Talk with your healthcare provider first.  · If antibiotics were prescribed, be sure you take them until they are finished. Dont stop taking them even when you feel better. Antibiotics must be taken as a full course.  To prevent the spread of illness:  · Remember that washing with soap and water and using alcohol-based  is the best way to prevent the spread of infection.  · Clean the toilet after each use.  · Wash your hands before eating.  · Wash your hands before and after preparing food. Keep in mind that people with diarrhea or vomiting should not prepare food for others.  · Wash your hands after using cutting boards, countertops, and knives that have been in contact with raw foods.  · Wash and then peel fruits and vegetables.  · Keep uncooked meats away from cooked and ready-to-eat foods.  · Use a food thermometer when cooking. Cook poultry to at least 165°F (74°C). Cook ground meat (beef, veal, pork, lamb) to at least 160°F (71°C). Cook fresh beef, veal, lamb, and pork to at least 145°F (63°C).  · Dont eat raw or undercooked eggs (poached or merritt side up), poultry, meat, or unpasteurized milk and juices.  Food and drinks  The main goal while treating vomiting or diarrhea is to prevent dehydration. This is done by taking small amounts of liquids often.  · Keep in mind that liquids are more important than food right now.  · Drink only small amounts of liquids at a time.  · Dont force yourself to eat, especially if you are having cramping, vomiting, or diarrhea. Dont eat large amounts at a time, even if you are hungry.  · If you eat, avoid fatty, greasy, spicy, or fried foods.  · Dont eat dairy foods or drink milk if you have diarrhea. These can make diarrhea worse.  During the first 24 hours you can try:  · Oral rehydration solutions. Do not use sports drinks. They have too much sugar and not enough electrolytes.  · Soft drinks without  caffeine  · Ginger ale  · Water (plain or flavored)  · Decaf tea or coffee  · Clear broth, consommé, or bouillon  · Gelatin, popsicles, or frozen fruit juice bars  The second 24 hours, if you are feeling better, you can add:  · Hot cereal, plain toast, bread, rolls, or crackers  · Plain noodles, rice, mashed potatoes, chicken noodle soup, or rice soup  · Unsweetened canned fruit (no pineapple)  · Bananas  As you recover:  · Limit fat intake to less than 15 grams per day. Dont eat margarine, butter, oils, mayonnaise, sauces, gravies, fried foods, peanut butter, meat, poultry, or fish.  · Limit fiber. Dont eat raw or cooked vegetables, fresh fruits except bananas, or bran cereals.  · Limit caffeine and chocolate.  · Limit dairy.  · Dont use spices or seasonings except salt.  · Go back to your normal diet over time, as you feel better and your symptoms improve.  · If the symptoms come back, go back to a simple diet or clear liquids.  Follow-up care  Follow up with your healthcare provider, or as advised. If a stool sample was taken or cultures were done, call the healthcare provider for the results as instructed.  Call 911  Call 911 if you have any of these symptoms:  · Trouble breathing  · Confusion  · Extreme drowsiness or trouble walking  · Loss of consciousness  · Rapid heart rate  · Chest pain  · Stiff neck  · Seizure  When to seek medical advice  Call your healthcare provider right away if any of these occur:  · Abdominal pain that gets worse  · Constant lower right abdominal pain  · Continued vomiting and inability to keep liquids down  · Diarrhea more than 5 times a day  · Blood in vomit or stool  · Dark urine or no urine for 8 hours, dry mouth and tongue, tiredness, weakness, or dizziness  · Drowsiness  · New rash  · You dont get better in 2 to 3 days  · Fever of 100.4°F (38°C) or higher that doesnt get lower with medicine  Date Last Reviewed: 1/3/2016  © 7480-9243 [employer]. [address] [place] [address]  [place] [address]. All rights reserved. This information is not intended as a substitute for professional medical care. Always follow your healthcare professional's instructions.        Dehydration (Adult)  Dehydration occurs when your body loses too much fluid. This may be the result of prolonged vomiting or diarrhea, excessive sweating, or a high fever. It may also happen if you dont drink enough fluid when youre sick or out in the heat. Misuse of diuretics (water pills) can also be a cause.  Symptoms include thirst and decreased urine output. You may also feel dizzy, weak, fatigued, or very drowsy. The diet described below is usually enough to treat dehydration. In some cases, you may need medicine.  Home care  · Drink at least 12 8-ounce glasses of fluid every day to resolve the dehydration. Fluid may include water; orange juice; lemonade; apple, grape, or cranberry juice; clear fruit drinks; electrolyte replacement and sports drinks; and teas and coffee without caffeine. If you have been diagnosed with a kidney disease, ask your doctor how much and what types of fluids you should drink to prevent dehydration. If you have kidney disease, fluid can build up in the body. This can be dangerous to your health.  · If you have a fever, muscle aches, or a headache as a result of a cold or flu, you may take acetaminophen or ibuprofen, unless another medicine was prescribed. If you have chronic liver or kidney disease, or have ever had a stomach ulcer or gastrointestinal bleeding, talk with your health care provider before using these medicines. Don't take aspirin if you are younger than 18 and have a fever. Aspirin raises the chance for severe liver injury.  Follow-up care  Follow up with your health care provider, or as advised.  When to seek medical advice  Call your health care provider right away if any of these occur:  · Continued vomiting  · Frequent diarrhea (more than 5 times a day); blood (red or  black color) or mucus in diarrhea  · Blood in vomit or stool  · Swollen abdomen or increasing abdominal pain  · Weakness, dizziness, or fainting  · Unusual drowsiness or confusion  · Reduced urine output or extreme thirst  · Fever of 100.4°F (34°C) or higher  Date Last Reviewed: 5/31/2015  © 1708-4189 [employer] [address] [place] [address]. All rights reserved. This information is not intended as a substitute for professional medical care. Always follow your healthcare professional's instructions.

## 2017-04-30 LAB — BACTERIA STL CULT: NORMAL

## 2017-05-01 LAB — O+P STL TRI STN: NORMAL

## 2017-07-18 ENCOUNTER — OFFICE VISIT (OUTPATIENT)
Dept: FAMILY MEDICINE | Facility: CLINIC | Age: 22
End: 2017-07-18
Payer: MEDICAID

## 2017-07-18 VITALS
OXYGEN SATURATION: 98 % | HEART RATE: 79 BPM | HEIGHT: 68 IN | DIASTOLIC BLOOD PRESSURE: 70 MMHG | WEIGHT: 196.13 LBS | BODY MASS INDEX: 29.72 KG/M2 | TEMPERATURE: 99 F | SYSTOLIC BLOOD PRESSURE: 102 MMHG

## 2017-07-18 DIAGNOSIS — F33.1 MODERATE EPISODE OF RECURRENT MAJOR DEPRESSIVE DISORDER: Primary | ICD-10-CM

## 2017-07-18 PROCEDURE — 99213 OFFICE O/P EST LOW 20 MIN: CPT | Mod: PBBFAC,PO | Performed by: NURSE PRACTITIONER

## 2017-07-18 PROCEDURE — 99999 PR PBB SHADOW E&M-EST. PATIENT-LVL III: CPT | Mod: PBBFAC,,, | Performed by: NURSE PRACTITIONER

## 2017-07-18 PROCEDURE — 99214 OFFICE O/P EST MOD 30 MIN: CPT | Mod: S$PBB,,, | Performed by: NURSE PRACTITIONER

## 2017-07-18 RX ORDER — CITALOPRAM 10 MG/1
TABLET ORAL
Qty: 60 TABLET | Refills: 1 | Status: SHIPPED | OUTPATIENT
Start: 2017-07-18 | End: 2017-11-21 | Stop reason: DRUGHIGH

## 2017-07-18 NOTE — PROGRESS NOTES
Subjective:       Patient ID: Milka Smith is a 22 y.o. female.    Chief Complaint: Depression    22-year-old female presents to the clinic today with complaint of depression since April of this year.  She took Paxil in the past and said initially it worked and then quit working.  She got off the Paxil because she lost her insurance.  She would like to try a different medication.  She states that she has no motivation, no social skills, very little interactions with other people, has not friends, stays in her room most of the time and only comes out to eat, always feels angry for no reason, and sleeps too much.  She denies any suicidal thoughts, hallucinations, or homicidal ideations. She currently is not exercising.  She says she is not consistent with her exercise program.  She is an only child and lives with her mother and father.  She has a good support system with her mother.  Her father does not believe in medication for depression.  She states she's been suffering from depression for about the last 3-4 years.      Past Medical History:   Diagnosis Date    Depression     Overweight (BMI 25.0-29.9)      Past Surgical History:   Procedure Laterality Date    NO PAST SURGERIES        reports that she has never smoked. She has never used smokeless tobacco. She reports that she drinks alcohol. She reports that she does not use drugs.  Review of Systems   Respiratory: Negative for cough, shortness of breath and wheezing.    Cardiovascular: Negative for chest pain, palpitations and leg swelling.   Gastrointestinal: Negative for abdominal pain, diarrhea, nausea and vomiting.   Neurological: Negative for dizziness, light-headedness and headaches.   Psychiatric/Behavioral: Negative for hallucinations, self-injury, sleep disturbance and suicidal ideas. The patient is not nervous/anxious.        Objective:      Physical Exam   Constitutional: She appears well-developed and well-nourished. No distress.   Eyes:  Conjunctivae and EOM are normal. Pupils are equal, round, and reactive to light. Right eye exhibits no discharge. Left eye exhibits no discharge. No scleral icterus.   Cardiovascular: Normal rate, regular rhythm and normal heart sounds.  Exam reveals no gallop and no friction rub.    No murmur heard.  Pulmonary/Chest: Effort normal and breath sounds normal. No respiratory distress. She has no wheezes. She has no rales.   Abdominal: Soft. Bowel sounds are normal. There is no tenderness.   Musculoskeletal: Normal range of motion. She exhibits no edema.   Skin: Skin is warm and dry. She is not diaphoretic.   Psychiatric: She has a normal mood and affect. Her behavior is normal. Judgment and thought content normal.       Assessment:       1. Moderate episode of recurrent major depressive disorder        Plan:         Moderate episode of recurrent major depressive disorder  -     citalopram (CELEXA) 10 MG tablet; One every day x 1 week then increase 2 every day  Dispense: 60 tablet; Refill: 1      Discussed side effects of medication and black box warning regarding suicide patient verbalized understanding of above   No Follow-up on file.

## 2017-07-19 ENCOUNTER — TELEPHONE (OUTPATIENT)
Dept: FAMILY MEDICINE | Facility: CLINIC | Age: 22
End: 2017-07-19

## 2017-07-19 NOTE — TELEPHONE ENCOUNTER
Olena Galvez spoke with the pharmacy and told them to change Celexa to 20 mg take one half x 1 week then increase to one daily.

## 2017-08-01 ENCOUNTER — OFFICE VISIT (OUTPATIENT)
Dept: OBSTETRICS AND GYNECOLOGY | Facility: CLINIC | Age: 22
End: 2017-08-01
Payer: MEDICAID

## 2017-08-01 VITALS
DIASTOLIC BLOOD PRESSURE: 80 MMHG | HEIGHT: 68 IN | SYSTOLIC BLOOD PRESSURE: 115 MMHG | WEIGHT: 198.44 LBS | BODY MASS INDEX: 30.07 KG/M2

## 2017-08-01 DIAGNOSIS — Z01.419 ENCOUNTER FOR WELL WOMAN EXAM WITH ROUTINE GYNECOLOGICAL EXAM: Primary | ICD-10-CM

## 2017-08-01 DIAGNOSIS — Z11.3 SCREEN FOR STD (SEXUALLY TRANSMITTED DISEASE): ICD-10-CM

## 2017-08-01 DIAGNOSIS — Z30.011 INITIATION OF OCP (BCP): ICD-10-CM

## 2017-08-01 PROCEDURE — 99395 PREV VISIT EST AGE 18-39: CPT | Mod: S$PBB,,, | Performed by: OBSTETRICS & GYNECOLOGY

## 2017-08-01 PROCEDURE — 99213 OFFICE O/P EST LOW 20 MIN: CPT | Mod: PBBFAC | Performed by: OBSTETRICS & GYNECOLOGY

## 2017-08-01 PROCEDURE — 99999 PR PBB SHADOW E&M-EST. PATIENT-LVL III: CPT | Mod: PBBFAC,,, | Performed by: OBSTETRICS & GYNECOLOGY

## 2017-08-01 PROCEDURE — 88175 CYTOPATH C/V AUTO FLUID REDO: CPT

## 2017-08-01 PROCEDURE — 87591 N.GONORRHOEAE DNA AMP PROB: CPT

## 2017-08-01 RX ORDER — LEVONORGESTREL AND ETHINYL ESTRADIOL 0.15-0.03
1 KIT ORAL DAILY
Qty: 30 TABLET | Refills: 11 | Status: SHIPPED | OUTPATIENT
Start: 2017-08-01 | End: 2017-09-29 | Stop reason: SINTOL

## 2017-08-01 NOTE — PROGRESS NOTES
Subjective:       Patient ID: Milka Smith is a 22 y.o. female.    Chief Complaint:  Contraception      History of Present Illness  HPI  Annual Exam-Premenopausal  Patient presents for annual exam. The patient has no complaints today. The patient is sexually active. GYN screening history: no prior history of gyn screening tests. The patient wears seatbelts: yes. The patient participates in regular exercise: no. Has the patient ever been transfused or tattooed?: yes. The patient reports that there is not domestic violence in her life.    Pt had been on OCP's prior and desires to resume.  Pt is interested in delayed menses.    Pt completed Gardisil.                GYN & OB HistoryPatient's last menstrual period was 2017 (exact date).   Date of Last Pap: No result found    OB History    Para Term  AB Living   0 0 0 0 0 0   SAB TAB Ectopic Multiple Live Births   0 0 0 0               Review of Systems  Review of Systems   Constitutional: Negative.    Respiratory: Negative.    Cardiovascular: Negative.    Gastrointestinal: Negative.    Genitourinary: Negative.    Musculoskeletal: Negative.    Hematological: Negative.    Psychiatric/Behavioral: Negative.    Breast: negative.            Objective:    Physical Exam:   Constitutional: She is oriented to person, place, and time. She appears well-developed and well-nourished. No distress.    HENT:   Head: Normocephalic and atraumatic.     Neck: Normal range of motion.    Cardiovascular: Normal rate.     Pulmonary/Chest: Effort normal. No respiratory distress.        Abdominal: Soft. She exhibits no distension and no mass. There is no tenderness. There is no rebound and no guarding.     Genitourinary: Vagina normal and uterus normal. Pelvic exam was performed with patient supine. There is no rash, tenderness, lesion or injury on the right labia. There is no rash, tenderness, lesion or injury on the left labia. Cervix is normal. Right adnexum  displays no mass, no tenderness and no fullness. Left adnexum displays no mass, no tenderness and no fullness. Labial bartholins normal.          Musculoskeletal: Normal range of motion and moves all extremeties.       Neurological: She is alert and oriented to person, place, and time. No cranial nerve deficit. Coordination normal.    Skin: She is not diaphoretic.    Psychiatric: She has a normal mood and affect. Her behavior is normal. Judgment and thought content normal.          Assessment:     1.  Gyn Exam  2.  STD screen  3.  OCP initiation        Plan:      1.  Pap, GC/CT collected  2.  Rx [place], pt to start today

## 2017-08-02 LAB
C TRACH DNA SPEC QL NAA+PROBE: NOT DETECTED
N GONORRHOEA DNA SPEC QL NAA+PROBE: NOT DETECTED

## 2017-08-22 ENCOUNTER — OFFICE VISIT (OUTPATIENT)
Dept: FAMILY MEDICINE | Facility: CLINIC | Age: 22
End: 2017-08-22
Payer: MEDICAID

## 2017-08-22 VITALS
SYSTOLIC BLOOD PRESSURE: 102 MMHG | TEMPERATURE: 99 F | DIASTOLIC BLOOD PRESSURE: 62 MMHG | HEIGHT: 68 IN | BODY MASS INDEX: 30.25 KG/M2 | HEART RATE: 78 BPM | OXYGEN SATURATION: 98 % | WEIGHT: 199.63 LBS

## 2017-08-22 DIAGNOSIS — F33.1 MODERATE EPISODE OF RECURRENT MAJOR DEPRESSIVE DISORDER: ICD-10-CM

## 2017-08-22 PROCEDURE — 99213 OFFICE O/P EST LOW 20 MIN: CPT | Mod: PBBFAC,PO | Performed by: NURSE PRACTITIONER

## 2017-08-22 PROCEDURE — 99999 PR PBB SHADOW E&M-EST. PATIENT-LVL III: CPT | Mod: PBBFAC,,, | Performed by: NURSE PRACTITIONER

## 2017-08-22 PROCEDURE — 3008F BODY MASS INDEX DOCD: CPT | Mod: ,,, | Performed by: NURSE PRACTITIONER

## 2017-08-22 PROCEDURE — 99214 OFFICE O/P EST MOD 30 MIN: CPT | Mod: S$PBB,,, | Performed by: NURSE PRACTITIONER

## 2017-08-22 RX ORDER — CITALOPRAM 20 MG/1
20 TABLET, FILM COATED ORAL DAILY
Qty: 90 TABLET | Refills: 1 | Status: SHIPPED | OUTPATIENT
Start: 2017-08-22 | End: 2017-11-21

## 2017-08-22 NOTE — PROGRESS NOTES
Subjective:       Patient ID: Milka Smith is a 22 y.o. female.    Chief Complaint: Depression (F/U)    22-year-old female presents to the clinic today for follow-up on depression.  I saw her 2 weeks ago and started her on Celexa 10 mg for 1 week and then to increase to 20 mg.  She states she feels much better.  She appears very happy today and is smiling. She started going out with some friends.  She's not sleeping as much as she was in the past.  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  2 weeks ago she had some anxiety at night but that has resolved.  She denies any suicidal thoughts, hallucinations, or homicidal ideations.  She is tolerating the medication without any difficulty.      Past Medical History:   Diagnosis Date    Depression     Overweight (BMI 25.0-29.9)      Past Surgical History:   Procedure Laterality Date    NO PAST SURGERIES        reports that she has never smoked. She has never used smokeless tobacco. She reports that she drinks alcohol. She reports that she does not use drugs.  Review of Systems   Respiratory: Negative for cough, shortness of breath and wheezing.    Cardiovascular: Negative for chest pain, palpitations and leg swelling.   Gastrointestinal: Negative for abdominal pain, diarrhea and vomiting.   Musculoskeletal: Negative for gait problem.   Psychiatric/Behavioral: Negative for agitation, hallucinations, sleep disturbance and suicidal ideas. The patient is not nervous/anxious.        Objective:      Physical Exam   Constitutional: She is oriented to person, place, and time. She appears well-developed and well-nourished.   Eyes: Conjunctivae and EOM are normal. Pupils are equal, round, and reactive to light. Right eye exhibits no discharge. Left eye exhibits no discharge. No scleral icterus.   Cardiovascular: Normal rate, regular rhythm and normal heart sounds.  Exam reveals no gallop and no friction rub.    No murmur heard.  Pulmonary/Chest: Effort normal  and breath sounds normal. No respiratory distress. She has no wheezes. She has no rales.   Abdominal: Soft. Bowel sounds are normal. There is no tenderness.   Musculoskeletal: Normal range of motion. She exhibits no edema.   Neurological: She is alert and oriented to person, place, and time.   Skin: Skin is warm and dry. She is not diaphoretic.   Psychiatric: She has a normal mood and affect. Her behavior is normal. Thought content normal.       Assessment:       1. Moderate episode of recurrent major depressive disorder        Plan:         Moderate episode of recurrent major depressive disorder  -     citalopram (CELEXA) 20 MG tablet; Take 1 tablet (20 mg total) by mouth once daily.  Dispense: 90 tablet; Refill: 1     - The current medical regimen is effective;  continue present plan and medications.

## 2017-09-25 ENCOUNTER — OFFICE VISIT (OUTPATIENT)
Dept: OBSTETRICS AND GYNECOLOGY | Facility: CLINIC | Age: 22
End: 2017-09-25
Payer: MEDICAID

## 2017-09-25 VITALS
BODY MASS INDEX: 30.41 KG/M2 | WEIGHT: 200.63 LBS | HEIGHT: 68 IN | DIASTOLIC BLOOD PRESSURE: 78 MMHG | SYSTOLIC BLOOD PRESSURE: 114 MMHG

## 2017-09-25 DIAGNOSIS — N92.1 BREAKTHROUGH BLEEDING ON BIRTH CONTROL PILLS: Primary | ICD-10-CM

## 2017-09-25 PROCEDURE — 99213 OFFICE O/P EST LOW 20 MIN: CPT | Mod: PBBFAC | Performed by: OBSTETRICS & GYNECOLOGY

## 2017-09-25 PROCEDURE — 99999 PR PBB SHADOW E&M-EST. PATIENT-LVL III: CPT | Mod: PBBFAC,,, | Performed by: OBSTETRICS & GYNECOLOGY

## 2017-09-25 PROCEDURE — 99213 OFFICE O/P EST LOW 20 MIN: CPT | Mod: S$PBB,,, | Performed by: OBSTETRICS & GYNECOLOGY

## 2017-09-25 PROCEDURE — 3008F BODY MASS INDEX DOCD: CPT | Mod: ,,, | Performed by: OBSTETRICS & GYNECOLOGY

## 2017-09-25 NOTE — PROGRESS NOTES
"Cc:  Abnormal bleeding    HPI:  22 yro G0 who was seen for annual exam 8/1/17 and started on Seasonale OCP's presents due to > 2 wk hx of abnormal bleeding.  For first week, pt was just spotting; however, after that pt states bleeding has been much heavier and crampy.  Pt has been using OTC Motrin without relief.  Pt states she has been using a tampon q 2 hrs. Pt states she has been compliant with OCP's and started them approx 8/7/17.    Vitals:    09/25/17 1116   BP: 114/78   BP Location: Right arm   Patient Position: Sitting   Weight: 91 kg (200 lb 9.9 oz)   Height: 5' 8" (1.727 m)     30 minute discussion  Pt denies any heavy bleeding with initial menarche.  Pt denies heavy or prolonged bleeding when she cuts herself.  Discussed stopping OCP's for 7 days and then restarting.  If pt continues to bleed abnormally, then OCP's will be switched to Lomedia (generic for Lo Estrin Fe).  If still having abnormal bleeding, then will consider pelvic sono.    Assessment/Plan  Bleeding on OCP's, see discussion above  "

## 2017-09-29 ENCOUNTER — OFFICE VISIT (OUTPATIENT)
Dept: OBSTETRICS AND GYNECOLOGY | Facility: CLINIC | Age: 22
End: 2017-09-29
Payer: MEDICAID

## 2017-09-29 VITALS
WEIGHT: 199.5 LBS | HEIGHT: 69 IN | DIASTOLIC BLOOD PRESSURE: 78 MMHG | BODY MASS INDEX: 29.55 KG/M2 | SYSTOLIC BLOOD PRESSURE: 100 MMHG

## 2017-09-29 DIAGNOSIS — Z30.41 ENCOUNTER FOR SURVEILLANCE OF CONTRACEPTIVE PILLS: ICD-10-CM

## 2017-09-29 DIAGNOSIS — N92.6 IRREGULAR UTERINE BLEEDING: Primary | ICD-10-CM

## 2017-09-29 LAB
B-HCG UR QL: NEGATIVE
CTP QC/QA: YES

## 2017-09-29 PROCEDURE — 99212 OFFICE O/P EST SF 10 MIN: CPT | Mod: PBBFAC | Performed by: OBSTETRICS & GYNECOLOGY

## 2017-09-29 PROCEDURE — 3008F BODY MASS INDEX DOCD: CPT | Mod: ,,, | Performed by: OBSTETRICS & GYNECOLOGY

## 2017-09-29 PROCEDURE — 81025 URINE PREGNANCY TEST: CPT | Mod: PBBFAC | Performed by: OBSTETRICS & GYNECOLOGY

## 2017-09-29 PROCEDURE — 99214 OFFICE O/P EST MOD 30 MIN: CPT | Mod: S$PBB,,, | Performed by: OBSTETRICS & GYNECOLOGY

## 2017-09-29 PROCEDURE — 99999 PR PBB SHADOW E&M-EST. PATIENT-LVL II: CPT | Mod: PBBFAC,,, | Performed by: OBSTETRICS & GYNECOLOGY

## 2017-09-29 NOTE — PROGRESS NOTES
Chief Complaint   Patient presents with    Vaginal Bleeding     irregular menstrual    Pelvic Pain       HPI:  22 y.o. female  presents as a new patient to me    Patient's last menstrual period was 2017.    - 3 WEEKS OF DAILY BLEEDING; VOLUME IS SIMILAR TO HER MENSES  - STARTED SEASONALE ON ABOUT 2017  - LMP BEFORE STARTING OCPs WAS 2017  - BLEEDING STARTED 3 WEEKS AFTER STARTING SEASONALE; PT STOPPED TAKING PILLS ABOUT 1 WEEK AGO BUT HAS NOT STOPPED BLEEDING YET  - PRIOR TO STARTING SEASONALE, PT DESCRIBES REGULAR, 28-DAY CYCLES WITH 7 DAYS OF HEAVY MENSES  - PREVIOUSLY ON LOMEDIA OCPs  - DID WELL ON LOMEDIA, THOUGH IT DID NOT SIGNIFICANTLY DECREASE HER MENSES    Contraception: OCPs  Pap: 2017, NILM  Mammogram: N/A    Past Medical History:   Diagnosis Date    Depression     Overweight (BMI 25.0-29.9)      Past Surgical History:   Procedure Laterality Date    NO PAST SURGERIES         Social History   Substance Use Topics    Smoking status: Never Smoker    Smokeless tobacco: Never Used    Alcohol use Yes      Comment: Socially drinks beer     Family History   Problem Relation Age of Onset    Colon cancer Father 42    Hypertension Father     Diabetes Paternal Grandmother     No Known Problems Mother     Deep vein thrombosis Maternal Grandmother     Cancer Maternal Grandmother     Heart disease Paternal Grandfather     Hypertension Paternal Grandfather     Breast cancer       maternal great aunt     OB History    Para Term  AB Living   0 0 0 0 0 0   SAB TAB Ectopic Multiple Live Births   0 0 0 0               MEDICATIONS: Reviewed with patient.  ALLERGIES: Review of patient's allergies indicates no known allergies.     ROS:  Review of Systems   Constitutional: Negative for fever.   Respiratory: Negative for shortness of breath.    Cardiovascular: Negative for chest pain.   Gastrointestinal: Negative for nausea and vomiting.   Genitourinary: Positive for  "menstrual problem and vaginal bleeding.   Neurological: Negative for headaches.   Hematological: Does not bruise/bleed easily.       PHYSICAL EXAM:    /78   Ht 5' 9" (1.753 m)   Wt 90.5 kg (199 lb 8.3 oz)   LMP 09/09/2017   BMI 29.46 kg/m²     Physical Exam:   Constitutional: She is oriented to person, place, and time. She appears well-developed.    HENT:   Head: Normocephalic.       Pulmonary/Chest: Effort normal.                      Neurological: She is alert and oriented to person, place, and time.     Psychiatric: She has a normal mood and affect.         ASSESSMENT & PLAN:   Irregular uterine bleeding  -     POCT urine pregnancy  -     norethindrone-e.estradiol-iron 1 mg-20 mcg (24)/75 mg (4) Oral per tablet; Take 1 tablet by mouth once daily.  Dispense: 28 tablet; Refill: 12    Encounter for surveillance of contraceptive pills  -     norethindrone-e.estradiol-iron 1 mg-20 mcg (24)/75 mg (4) Oral per tablet; Take 1 tablet by mouth once daily.  Dispense: 28 tablet; Refill: 12    - COUNSELED PT THAT STARTING OCPs MID-CYCLE COULD CAUSE IRREGULAR BLEEDING  - COUNSELED PT THAT SOME WOMEN HAVE IRREGULAR BLEEDING WITH SEASONALE  - COUNSELED PT ON OTHER CONTRACEPTIVE OPTIONS FOR REDUCING HER CYCLE INCLUDING DEPO-PROVERA OR MIRENA IUD  - COUNSELED PT THAT IF SHE WANTS TO CONTINUE WITH OCPs, SHE WILL LIKELY NEED TO HAVE A MONTHLY CYCLE  - COUNSELED PT ON OTHER POSSIBLE CAUSES FOR IRREGULAR BLEEDING INCLUDING INFECTION OR FIBROIDS    - PT WOULD LIKE TO RESTART REGULAR OCPs  - RX PROVIDED  - PT COUNSELED ON INITIATION AND LIKELY IRREGULAR BLEEDING WITH INITIATION, AS WELL AS EFFECTIVENESS AND COMPLIANCE    - IF BLEEDING DOES NOT IMPROVE/STOP OVER THE NEXT 1-2 WEEKS, RETURN TO [facility] FOR FURTHER EVALUATION  - OTHERWISE, RTC IN 4 MONTHS FOR OCP SURVEILLANCE    Total visit time was 25 minutes with greater than 50% of time dedicated to counseling.  "

## 2017-10-11 ENCOUNTER — TELEPHONE (OUTPATIENT)
Dept: OBSTETRICS AND GYNECOLOGY | Facility: CLINIC | Age: 22
End: 2017-10-11

## 2017-10-11 NOTE — TELEPHONE ENCOUNTER
Pt called office and stated that her insurance do not cover her birth control. Informed pt to call her insurance to see what is covered and call the office back with what birth control is covered under her insurance. Pt verbalized understanding

## 2017-10-11 NOTE — TELEPHONE ENCOUNTER
----- Message from Michelle Abdi sent at 10/9/2017  2:51 PM CDT -----  Contact: self  Patient states her pharmacy will not fill her Rx for the birth control without a prior authorization. Patient can be reached at [phone]

## 2017-10-11 NOTE — TELEPHONE ENCOUNTER
----- Message from Latisha Yates MA sent at 10/9/2017  4:31 PM CDT -----    Milka Smith   Female, 22 y.o., 1995  Weight:   90.5 kg (199 lb 8.3 oz)  Phone:   #M:[phone]  PCP:   Cuate Parker MD  Language:   English  Need Interp:   No  Allergies:   No Known Allergies  Health Maintenance:   Due  Primary Ins.:   MEDICAID  MRN:   [id number]  Pt Comm Pref:   None  Patient Portal:   Active  Next Appt:   11/13/2017  My Sticky Note:     Message   Received: Today   Message Contents   Michelle Castillo Staff  Caller: self (Today,  2:51 PM)         Patient states her pharmacy will not fill her Rx for the birth control without a prior authorization. Patient can be reached at [phone]

## 2017-11-21 ENCOUNTER — OFFICE VISIT (OUTPATIENT)
Dept: FAMILY MEDICINE | Facility: CLINIC | Age: 22
End: 2017-11-21
Payer: MEDICAID

## 2017-11-21 ENCOUNTER — LAB VISIT (OUTPATIENT)
Dept: LAB | Facility: HOSPITAL | Age: 22
End: 2017-11-21
Attending: FAMILY MEDICINE
Payer: MEDICAID

## 2017-11-21 VITALS
OXYGEN SATURATION: 97 % | WEIGHT: 202.5 LBS | TEMPERATURE: 98 F | SYSTOLIC BLOOD PRESSURE: 101 MMHG | DIASTOLIC BLOOD PRESSURE: 72 MMHG | BODY MASS INDEX: 29.99 KG/M2 | HEIGHT: 69 IN | HEART RATE: 79 BPM

## 2017-11-21 DIAGNOSIS — Z00.00 ROUTINE PHYSICAL EXAMINATION: ICD-10-CM

## 2017-11-21 DIAGNOSIS — Z00.00 ROUTINE PHYSICAL EXAMINATION: Primary | ICD-10-CM

## 2017-11-21 DIAGNOSIS — F33.1 MODERATE EPISODE OF RECURRENT MAJOR DEPRESSIVE DISORDER: ICD-10-CM

## 2017-11-21 DIAGNOSIS — G47.00 INSOMNIA, UNSPECIFIED TYPE: ICD-10-CM

## 2017-11-21 LAB
ALBUMIN SERPL BCP-MCNC: 3.5 G/DL
ALP SERPL-CCNC: 80 U/L
ALT SERPL W/O P-5'-P-CCNC: 12 U/L
ANION GAP SERPL CALC-SCNC: 8 MMOL/L
AST SERPL-CCNC: 20 U/L
BASOPHILS # BLD AUTO: 0.07 K/UL
BASOPHILS NFR BLD: 1.3 %
BILIRUB SERPL-MCNC: 0.6 MG/DL
BUN SERPL-MCNC: 13 MG/DL
CALCIUM SERPL-MCNC: 9.9 MG/DL
CHLORIDE SERPL-SCNC: 104 MMOL/L
CHOLEST SERPL-MCNC: 188 MG/DL
CHOLEST/HDLC SERPL: 3.5 {RATIO}
CO2 SERPL-SCNC: 25 MMOL/L
CREAT SERPL-MCNC: 1 MG/DL
DIFFERENTIAL METHOD: ABNORMAL
EOSINOPHIL # BLD AUTO: 0.5 K/UL
EOSINOPHIL NFR BLD: 9.7 %
ERYTHROCYTE [DISTWIDTH] IN BLOOD BY AUTOMATED COUNT: 14.6 %
EST. GFR  (AFRICAN AMERICAN): >60 ML/MIN/1.73 M^2
EST. GFR  (NON AFRICAN AMERICAN): >60 ML/MIN/1.73 M^2
ESTIMATED AVG GLUCOSE: 100 MG/DL
GLUCOSE SERPL-MCNC: 82 MG/DL
HBA1C MFR BLD HPLC: 5.1 %
HCT VFR BLD AUTO: 40 %
HDLC SERPL-MCNC: 54 MG/DL
HDLC SERPL: 28.7 %
HGB BLD-MCNC: 13.6 G/DL
IMM GRANULOCYTES # BLD AUTO: 0.01 K/UL
IMM GRANULOCYTES NFR BLD AUTO: 0.2 %
LDLC SERPL CALC-MCNC: 122.2 MG/DL
LYMPHOCYTES # BLD AUTO: 2.4 K/UL
LYMPHOCYTES NFR BLD: 44.3 %
MCH RBC QN AUTO: 30.5 PG
MCHC RBC AUTO-ENTMCNC: 34 G/DL
MCV RBC AUTO: 90 FL
MONOCYTES # BLD AUTO: 0.8 K/UL
MONOCYTES NFR BLD: 13.7 %
NEUTROPHILS # BLD AUTO: 1.7 K/UL
NEUTROPHILS NFR BLD: 30.8 %
NONHDLC SERPL-MCNC: 134 MG/DL
NRBC BLD-RTO: 0 /100 WBC
PLATELET # BLD AUTO: 231 K/UL
PMV BLD AUTO: 10.9 FL
POTASSIUM SERPL-SCNC: 5 MMOL/L
PROT SERPL-MCNC: 7.6 G/DL
RBC # BLD AUTO: 4.46 M/UL
SODIUM SERPL-SCNC: 137 MMOL/L
T4 FREE SERPL-MCNC: 0.99 NG/DL
TRIGL SERPL-MCNC: 59 MG/DL
TSH SERPL DL<=0.005 MIU/L-ACNC: 0.9 UIU/ML
WBC # BLD AUTO: 5.49 K/UL

## 2017-11-21 PROCEDURE — 84443 ASSAY THYROID STIM HORMONE: CPT

## 2017-11-21 PROCEDURE — 84439 ASSAY OF FREE THYROXINE: CPT

## 2017-11-21 PROCEDURE — 99395 PREV VISIT EST AGE 18-39: CPT | Mod: S$PBB,,, | Performed by: FAMILY MEDICINE

## 2017-11-21 PROCEDURE — 99213 OFFICE O/P EST LOW 20 MIN: CPT | Mod: PBBFAC,PO | Performed by: FAMILY MEDICINE

## 2017-11-21 PROCEDURE — 36415 COLL VENOUS BLD VENIPUNCTURE: CPT | Mod: PO

## 2017-11-21 PROCEDURE — 83036 HEMOGLOBIN GLYCOSYLATED A1C: CPT

## 2017-11-21 PROCEDURE — 80053 COMPREHEN METABOLIC PANEL: CPT

## 2017-11-21 PROCEDURE — 85025 COMPLETE CBC W/AUTO DIFF WBC: CPT

## 2017-11-21 PROCEDURE — 99999 PR PBB SHADOW E&M-EST. PATIENT-LVL III: CPT | Mod: PBBFAC,,, | Performed by: FAMILY MEDICINE

## 2017-11-21 PROCEDURE — 80061 LIPID PANEL: CPT

## 2017-11-21 RX ORDER — CITALOPRAM 40 MG/1
40 TABLET, FILM COATED ORAL DAILY
Qty: 90 TABLET | Refills: 3 | Status: SHIPPED | OUTPATIENT
Start: 2017-11-21 | End: 2022-06-01 | Stop reason: ALTCHOICE

## 2017-11-21 RX ORDER — TRAZODONE HYDROCHLORIDE 50 MG/1
50 TABLET ORAL NIGHTLY PRN
Qty: 30 TABLET | Refills: 11 | Status: SHIPPED | OUTPATIENT
Start: 2017-11-21 | End: 2022-09-16

## 2017-11-21 NOTE — PROGRESS NOTES
[facility]  Progress Note    SUBJECTIVE:     Chief Complaint   Patient presents with    Depression     follow up medication        HPI   Milka Smith  is a 22 y.o. female here for routine physical exam. She says the anxiety/depression has improved but still not motivated to do much. She thinks theres more room for improvement. Has been trying to ride bike every morning. Doing well otherwise and has no new complaints/problems at this time.    Review of patient's allergies indicates:  No Known Allergies    Past Medical History:   Diagnosis Date    Depression     Overweight (BMI 25.0-29.9)      Past Surgical History:   Procedure Laterality Date    NO PAST SURGERIES       Family History   Problem Relation Age of Onset    Colon cancer Father 42    Hypertension Father     Diabetes Paternal Grandmother     No Known Problems Mother     Deep vein thrombosis Maternal Grandmother     Cancer Maternal Grandmother     Heart disease Paternal Grandfather     Hypertension Paternal Grandfather     Breast cancer       maternal great aunt     Social History   Substance Use Topics    Smoking status: Never Smoker    Smokeless tobacco: Never Used    Alcohol use Yes      Comment: Socially drinks beer        Review of Systems   Constitutional: Negative for chills, diaphoresis, fever and malaise/fatigue.   HENT: Negative.  Negative for congestion, ear pain and sore throat.    Eyes: Negative for photophobia and discharge.   Respiratory: Negative.  Negative for cough, shortness of breath and wheezing.    Cardiovascular: Negative.  Negative for chest pain and palpitations.   Gastrointestinal: Negative.  Negative for abdominal pain, constipation, diarrhea, nausea and vomiting.   Genitourinary: Negative.  Negative for dysuria and hematuria.   Musculoskeletal: Negative for back pain and myalgias.   Skin: Negative for itching and rash.   Neurological: Negative for dizziness, sensory change, focal weakness, weakness  and headaches.   Psychiatric/Behavioral: Positive for depression. The patient is nervous/anxious and has insomnia.    All other systems reviewed and are negative.    OBJECTIVE:     Vitals:    11/21/17 1457   BP: 101/72   Pulse: 79   Temp: 98.4 °F (36.9 °C)     Body mass index is 29.9 kg/m².    Physical Exam   Constitutional: She is oriented to person, place, and time and well-developed, well-nourished, and in no distress. No distress.   HENT:   Head: Normocephalic and atraumatic.   Right Ear: Tympanic membrane is not perforated, not erythematous and not bulging. No hemotympanum.   Left Ear: Tympanic membrane is not perforated, not erythematous and not bulging. No hemotympanum.   Nose: Nose normal.   Mouth/Throat: Oropharynx is clear and moist. No oropharyngeal exudate.   Eyes: Conjunctivae and EOM are normal. Pupils are equal, round, and reactive to light.   Neck: No thyromegaly present.   Cardiovascular: Normal rate, regular rhythm and normal heart sounds.  Exam reveals no gallop and no friction rub.    No murmur heard.  Pulmonary/Chest: Effort normal and breath sounds normal. No respiratory distress. She has no wheezes. She has no rales. She exhibits no tenderness.   Abdominal: Soft. Bowel sounds are normal. She exhibits no distension. There is no tenderness. There is no rebound and no guarding.   Musculoskeletal: Normal range of motion. She exhibits no edema or tenderness.   Lymphadenopathy:     She has no cervical adenopathy.   Neurological: She is alert and oriented to person, place, and time.   Skin: Skin is warm. No rash noted. She is not diaphoretic. No erythema.       Old records were reviewed. Labs and/or images were independently reviewed.    ASSESSMENT     1. Routine physical examination    2. Moderate episode of recurrent major depressive disorder    3. Insomnia, unspecified type        PLAN:     Routine physical examination  -     CBC auto differential; Future  -     Comprehensive metabolic panel;  Future  -     Hemoglobin A1c; Future  -     Lipid panel; Future  -     TSH; Future  -     T4, free; Future  -     We briefly discussed diet, exercise, and routine preventive exams. All questions and comments addressed.    Moderate episode of recurrent major depressive disorder  -     Increase citalopram (CELEXA) 40 MG tablet; Take 1 tablet (40 mg total) by mouth once daily.  Dispense: 90 tablet; Refill: 3  -     Discussed calming techniques. Take medications as prescribed. Recommended patient keep track of what can trigger anxiety.    Insomnia, unspecified type  -     Start traZODone (DESYREL) 50 MG tablet; Take 1 tablet (50 mg total) by mouth nightly as needed for Insomnia.  Dispense: 30 tablet; Refill: 11  -     Needs 8 hours of sleep at night, which should help her anxiety/depression. Try melatonin 5 mg at night first.       RTC PRZEV Parker MD  11/21/2017 4:29 PM

## 2018-03-22 ENCOUNTER — TELEPHONE (OUTPATIENT)
Dept: FAMILY MEDICINE | Facility: CLINIC | Age: 23
End: 2018-03-22

## 2018-03-22 DIAGNOSIS — L84 CALLUS OF FOOT: Primary | ICD-10-CM

## 2018-03-22 NOTE — TELEPHONE ENCOUNTER
Spoke w/patient' mother, requesting a podiatry referral. States patient has bunions and callus on both feet.

## 2018-03-28 ENCOUNTER — TELEPHONE (OUTPATIENT)
Dept: ADMINISTRATIVE | Facility: HOSPITAL | Age: 23
End: 2018-03-28

## 2018-03-28 NOTE — TELEPHONE ENCOUNTER
----- Message from Andria Peace sent at 3/28/2018 12:58 PM CDT -----  Contact: mother  [phone]  Pt mother Bhargavi is returning your office call. Pls call mother Bhargavi [phone]. . Thanks...............Gabby

## 2018-05-09 ENCOUNTER — OFFICE VISIT (OUTPATIENT)
Dept: PODIATRY | Facility: CLINIC | Age: 23
End: 2018-05-09
Payer: MEDICAID

## 2018-05-09 VITALS
HEIGHT: 69 IN | RESPIRATION RATE: 14 BRPM | WEIGHT: 200 LBS | SYSTOLIC BLOOD PRESSURE: 110 MMHG | DIASTOLIC BLOOD PRESSURE: 76 MMHG | BODY MASS INDEX: 29.62 KG/M2 | HEART RATE: 68 BPM

## 2018-05-09 DIAGNOSIS — M20.5X2 HALLUX LIMITUS, ACQUIRED, LEFT: ICD-10-CM

## 2018-05-09 DIAGNOSIS — M21.41 PES PLANUS OF BOTH FEET: ICD-10-CM

## 2018-05-09 DIAGNOSIS — M20.12 HALLUX ABDUCTO VALGUS, LEFT: ICD-10-CM

## 2018-05-09 DIAGNOSIS — M20.11 HALLUX ABDUCTO VALGUS, RIGHT: Primary | ICD-10-CM

## 2018-05-09 DIAGNOSIS — M20.41 HAMMER TOES OF BOTH FEET: ICD-10-CM

## 2018-05-09 DIAGNOSIS — M20.42 HAMMER TOES OF BOTH FEET: ICD-10-CM

## 2018-05-09 DIAGNOSIS — M21.42 PES PLANUS OF BOTH FEET: ICD-10-CM

## 2018-05-09 DIAGNOSIS — M76.822 POSTERIOR TIBIAL TENDON DYSFUNCTION (PTTD) OF BOTH LOWER EXTREMITIES: ICD-10-CM

## 2018-05-09 DIAGNOSIS — M76.821 POSTERIOR TIBIAL TENDON DYSFUNCTION (PTTD) OF BOTH LOWER EXTREMITIES: ICD-10-CM

## 2018-05-09 DIAGNOSIS — M20.5X1 HALLUX LIMITUS, ACQUIRED, RIGHT: ICD-10-CM

## 2018-05-09 PROCEDURE — 99999 PR PBB SHADOW E&M-EST. PATIENT-LVL III: CPT | Mod: PBBFAC,,, | Performed by: PODIATRIST

## 2018-05-09 PROCEDURE — 99203 OFFICE O/P NEW LOW 30 MIN: CPT | Mod: S$PBB,,, | Performed by: PODIATRIST

## 2018-05-09 PROCEDURE — 99213 OFFICE O/P EST LOW 20 MIN: CPT | Mod: PBBFAC,PO | Performed by: PODIATRIST

## 2018-05-09 NOTE — PROGRESS NOTES
Subjective:      Patient ID: Milka Smith is a 22 y.o. female.    Chief Complaint: Bunions (Patient states she has bunions and she has pain if she stands for more than two hours. Would like to get them shaved.)    Milka is a 22 y.o. female who presents to the podiatry clinic with complaint of bilateral bunion uncomfortable in certain shoes. Onset of the symptoms was several months ago. Precipitating event: none known. Current symptoms include: stiffness, swelling and worsening symptoms after a period of activity. Aggravating factors: walking. Symptoms have waxed and waned. Patient has had prior foot problems. Evaluation to date: none. Treatment to date: none. Patients rates pain 2/10 on pain scale.    Patient admits to barefoot walking often in and around home    Current shoe gear:  [employer]          Patient Active Problem List   Diagnosis    Overweight (BMI 25.0-29.9)    Moderate episode of recurrent major depressive disorder       Current Outpatient Prescriptions on File Prior to Visit   Medication Sig Dispense Refill    citalopram (CELEXA) 40 MG tablet Take 1 tablet (40 mg total) by mouth once daily. 90 tablet 3    traZODone (DESYREL) 50 MG tablet Take 1 tablet (50 mg total) by mouth nightly as needed for Insomnia. 30 tablet 11     No current facility-administered medications on file prior to visit.        Review of patient's allergies indicates:  No Known Allergies    Past Surgical History:   Procedure Laterality Date    NO PAST SURGERIES         Family History   Problem Relation Age of Onset    Colon cancer Father 42    Hypertension Father     Diabetes Paternal Grandmother     No Known Problems Mother     Deep vein thrombosis Maternal Grandmother     Cancer Maternal Grandmother     Heart disease Paternal Grandfather     Hypertension Paternal Grandfather     Breast cancer Unknown         maternal great aunt       Social History     Social History    Marital status: Single     Spouse  "name: N/A    Number of children: 0    Years of education: N/A     Occupational History    student      Social History Main Topics    Smoking status: Current Every Day Smoker     Types: Vaping w/o nicotine    Smokeless tobacco: Never Used    Alcohol use Yes      Comment: Socially drinks beer    Drug use: No    Sexual activity: No     Other Topics Concern    Not on file     Social History Narrative    Currently at [employer] studying business.       Review of Systems   Constitution: Negative for chills, fever and weakness.   Cardiovascular: Negative for claudication and leg swelling.   Respiratory: Negative for cough and shortness of breath.    Skin: Negative for dry skin, itching, nail changes and rash.   Musculoskeletal: Positive for myalgias. Negative for falls, joint pain, joint swelling and muscle weakness.   Gastrointestinal: Negative for diarrhea, nausea and vomiting.   Neurological: Negative for numbness, paresthesias and tremors.   Psychiatric/Behavioral: Negative for altered mental status and hallucinations.           Objective:      Vitals:    05/09/18 1540   BP: 110/76   Pulse: 68   Resp: 14   Weight: 90.7 kg (200 lb)   Height: 5' 9" (1.753 m)   PainSc: 0-No pain       Physical Exam   Constitutional:  Non-toxic appearance. She does not have a sickly appearance. No distress.   Cardiovascular:   Pulses:       Dorsalis pedis pulses are 2+ on the right side, and 2+ on the left side.        Posterior tibial pulses are 2+ on the right side, and 2+ on the left side.   Pulmonary/Chest: No respiratory distress.   Musculoskeletal:        Right ankle: She exhibits decreased range of motion. She exhibits no swelling. No tenderness. No lateral malleolus, no medial malleolus, no AITFL, no CF ligament and no posterior TFL tenderness found. Achilles tendon exhibits no pain, no defect and normal Armando's test results.        Left ankle: She exhibits decreased range of motion. She exhibits no swelling. No tenderness. No " lateral malleolus, no medial malleolus, no AITFL, no CF ligament and no posterior TFL tenderness found. Achilles tendon exhibits no pain, no defect and normal Armando's test results.        Right foot: There is no bony tenderness.        Left foot: There is deformity. There is no bony tenderness.   There is equinus deformity bilateral with decreased dorsiflexion at the ankle joint bilateral.  Gait analysis reveals excessive pronation through midstance and propulsion with early heel off. Shoes reveals lateral heel counter wear bilateral    RCSP ~15 degrees everted left no single leg toe raise, positive lateral malleolar index and too many toe sign.  No crepitus to attempted reduction. Medial arch collapse.    Patient has hammertoes of digits 2-5 bilateral partially reducible without symptom today.    Decreased first MPJ range of motion both weightbearing and nonweightbearing, no crepitus observed the first MP joint    Visible and palpable bunion without pain at dorsomedial 1st metatarsal head right and left.  Hallux abducted right and left partially reducible, tracks laterally without being track bound.  No ecchymosis, erythema, edema, or cardinal signs infection or signs of trauma same foot.     Neurological: She has normal reflexes. She displays no atrophy and no tremor. No sensory deficit. She exhibits normal muscle tone.   Skin: Skin is warm, dry and intact. No bruising, no burn, no laceration, no lesion and no rash noted. She is not diaphoretic. No cyanosis. No pallor. Nails show no clubbing.   Psychiatric: Her mood appears not anxious. Her affect is not inappropriate. Her speech is not slurred. She is not combative. She is communicative. She is attentive.   Nursing note reviewed.                Assessment:       Encounter Diagnoses   Name Primary?    Hallux abducto valgus, right Yes    Hallux abducto valgus, left     Hammer toes of both feet     Hallux limitus, acquired, right     Hallux limitus,  acquired, left     Pes planus of both feet     Posterior tibial tendon dysfunction (PTTD) of both lower extremities          Plan:       Milka was seen today for bunions.    Diagnoses and all orders for this visit:    Hallux abducto valgus, right    Hallux abducto valgus, left    Hammer toes of both feet    Hallux limitus, acquired, right    Hallux limitus, acquired, left    Pes planus of both feet    Posterior tibial tendon dysfunction (PTTD) of both lower extremities      I counseled the patient on her conditions, their implications and medical management.    Greater than 50% of this visit spent on counseling and coordination of care.    Although patient came in to discuss appearance of bunion. She was discovered to have significant PTTD.  Long conversation and education on the deforming nature of PTTD    I gave written and verbal instructions on stretching exercises. Patient expressed understanding. Discussed icing the affected area as needed and also wearing appropriate shoe gear and avoiding flats, slippers, sandals, and going barefoot. My recommendation for OTC bracing and arch supports such as [employer]enco OrthoticArch. Patient instructed on adequate icing techniques. Patient should ice the affected area at least once per day x 10 minutes. I advised the patient that extra icing would also be beneficial to ensure adequate anti inflammatory effect. We also discussed cortisone injections and NSAID therapy.     RTC PRN

## 2018-05-09 NOTE — LETTER
May 13, 2018      Cuate Parker MD  [address]  [place] [address]           [facility]  [address]  [place] [address]  Phone: [phone]          Patient: Milka Smith   MR Number: [id number]   YOB: 1995   Date of Visit: 5/9/2018       Dear Dr. Cuate Parker:    Thank you for referring Milka Smith to me for evaluation. Attached you will find relevant portions of my assessment and plan of care.    If you have questions, please do not hesitate to call me. I look forward to following Milka Smith along with you.    Sincerely,    Lesvia De La Rosa DPM    Enclosure  CC:  No Recipients    If you would like to receive this communication electronically, please contact [email or web address] or [phone] to request more information on [employer] Link access.    For providers and/or their staff who would like to refer a patient to [facility], please contact us through our one-stop-shop provider referral line, [facility] , at [phone].    If you feel you have received this communication in error or would no longer like to receive these types of communications, please e-mail [email or web address]

## 2018-05-09 NOTE — PATIENT INSTRUCTIONS
Your Shoe size is 12  You need a shoe with: arch support and a forefoot rocker    Recommend lotions: eucerin, eucerin for diabetics, aquaphor, A&D ointment, gold bond for diabetics, sween, [place]'s Bees all purpose baby ointment,  urea 40 with aloe (found on amazon.com)    Shoe recommendations: (try 6pm.com, zappos.com , nordstromrack.ProNerve, or shoes.ProNerve for discounted prices) you can visit [employer] in [place]  or [facility] in the [facility] (there are also several shoe brand outlets in the [facility])    Asics (GT 2000 or gel foundations), new balance stability type shoes (900 series), saucony (stabil c3),  Carrera (GTS or Beast or transcend), vionic, propet (tennis shoe)    sofft brand, clarks, crocs, aerosoles, naturalizers, SAS, ecco, born, nikunj stewart, rockports (dress shoes)    Vionic, burkenstocks, fitflops, propet (sandals)  Nike comfort thong sandals, crocs, propet (house shoes)    Nail Home remedy:  Vicks Vapor rub to nails for easier managability      Posterior tibial tendon dysfunction: More prevalent in women    By Jessica Alvarez MD   My foot hurts and my arch has collapsed. I cant wear the shoes I want to anymore.   Many orthopaedists have female patients with similar foot complaints. Although some orthopaedists may not appreciate the significance of limited shoe style options, it is problematic for many women. Moreover, these patients want to feel better. So what is the science behind fallen arches and why are more women than men affected?    Symptomatic flatfoot: AAFD/PTTD  Flatfoot does not have to be painful. Historical research indicates that some primitive peoples in [place] and [place] were all flatfooted. Although many adults have asymptomatic flatfoot, the symptomatic flatfoot that develops over time and is brought to the attention of orthopaedists is known as adult acquired flatfoot deformity (AAFD) or posterior tibial tendon dysfunction (PTTD).   PTTD encompasses a spectrum of pathologic  changes that progressively affect the posterior tibial tendon. These changes, depending on severity (stages I-IV), manifest as various alterations in foot and ankle tissue and, subsequently, kinematics. Strength and function change during gait patterns as the tendon and ligaments become weak. Swelling and pain may lead to deformity and changes in the shape of the foot. Disability, difficulty with shoe wear, and altered gait can result. In the later stages of the disease (stages III and IV), standing or walking can be painful.     Ligament changes in the foot in PTTD are not isolated to the posterior tibial tendon. The ligaments of the foot that undergo the most severe changes and degenerative involvement in patients with PTTD are the spring ligament complex (superomedial and inferomedial calcaneonavicular) and the talocalcaneal interosseous ligament, with the spring ligament being most affected. Ligament pathology is nearly as common as posterior tibial tendinopathy, and the goal of treatment should therefore be to prevent progressive failure of these structures.    Etiology of PTTD  Why does the PTT degenerate? The etiology is unclear. What is known is that patient factors do play a role. More than half of all patients with a ruptured PTT also have a history of hypertension, obesity, diabetes, a previous operation or trauma about the medial aspect of the ankle, or treatment with steroids. Hypovascularity of the tendon, previously believed to play a role, has been excluded as an etiologic factor based on a cadaveric study in which vascularity of the tendon did not differ with respect to patient age, sex, or side.   Changes in collagen may influence the development of PTTD. Injury and inflammation may weaken collagen tissue. Moreover, normal adult collagen undergoes very little metabolic turnover. Hence, fibrotic tissue and scar may be deposited in the tendon. After surgery or trauma, the deposition of collagen  fibrils (scar) during the repair process is largely irreversible and is a major feature of the pathologic changes.    An alteration in collagen content is also associated with PTTD. In a small study of nine patients with PTTD, damaged tissue had a higher proportion of type II collagen and relatively less type I collagen than normal tendon tissue. The analysis of results made no distinction between males and females. The authors concluded that the degenerative process results from marked changes in not only molecular composition of matrix collagens but also in structural organization. This change in composition would likely alter the mechanical resistance of the tissue, allowing kinematic changes to occur.     PTTD: More common in women  Recent literature has reported a higher prevalence of PTTD in women than in men, but with no explanation for this discrepancy as yet. Because the condition in women peaks during perimenopause, hormonal influence may have a role in degeneration of the tendon. The same processes that affect changes in bone (collagen I) during menopause could also affect tendons and ligaments, thereby contributing to the increased incidence of PTTD in women in their 50s.     Conclusion  It is not understood why PTTD is more prevalent in women than in men. Although comorbidities such as diabetes and obesity and mechanical influences such as trauma to the medial ankle have been reported to contribute to PTTD, they have not been analyzed based on sex. Likewise, pathologic changes in the tendon and ligaments at the structural level have been documented but without sex analysis. Because the prevalence of PTTD in women peaks during perimenopause, hormonal influence from loss of estrogen may be postulated as a factor in the prevalence of PTTD in women.  Elucidating the reasons for the discrepancy in the prevalence of PTTD between men and women will require further research into the role of changes in collagen  matrix composition with aging, hormonal influences, and injury patterns in men and women.         What Are Bunions?  The bone at the base of your big toe connects to a bone in the ball of your foot. The joint is where the 2 bones connect. Normally, the 2 bones lie almost in a straight line, with your big toe pointing straight ahead. But sometimes the big toe starts to turn in towards the smaller toes. This pushes the joint out to the side, causing a bony bump called a bunion. Bunions can be mild, moderate, or severe.     A bunion  is a small bump on the side of the foot at the base of the big toe. It forms when the big toe turns in toward the second toe. This pushes the joint at the base of the big toe out to the inside.    Symptoms of bunions  A bunion often causes pain and swelling around the joint at the base of the big toe. The skin may become red or warm. If the big toe pushes under the second toe, a painful corn may form on the top of or inside the second toe. In some cases, bunions cause no symptoms other than making the foot harder to fit in a shoe.  What can be done  · Wear comfortable shoes. Wearing shoes that are roomy across the toes and that have low heels (less than 2 inches) will help keep a bunion from getting worse or causing pain.  · Ask your healthcare provider about pads and inserts to help prevent corns and to cushion the bunion.  · Talk to your healthcare provider about bunion surgery and whether it is right for you.  Note  Your feet tend to get larger as you age. That means you may need to increase your shoe size from time to time to ensure that your shoes fit your feet comfortably.   Date Last Reviewed: 9/10/2015  © 5087-1560 [employer]. [address] [place] [address], [place] [address]. All rights reserved. This information is not intended as a substitute for professional medical care. Always follow your healthcare professional's instructions.        Treating Bunions  Although a  "bunion wont go away, wearing shoes that fit properly will often relieve the pain. Padding and icing the bunion may also help. Bunions that remain painful may need surgery.     Heels: Heel height should be low. The back of the shoe should  your heel firmly so the shoe doesn't flop when you walk.         Toes: There should be 1/2" between your longest toe and the tip of the shoe. The shoe should be wide enough for you to wiggle your toes.    Shoes  To relieve a bunion, you dont have to buy shoes that are ugly or out of fashion. But follow these tips:  · Shop for shoes late in the day. This is when your feet are the largest.  · Have both feet measured often. Fit shoes to your larger foot.  · Look for shoes that have the same shape as your foot but are slightly wider across the toes.  · Choose low-heeled shoes.  · Always try shoes on. Stand up and walk around. If the shoes arent comfortable, dont buy them.  Ice massage  To help relieve a painful bunion, put an ice cube in a plastic bag. Rub the ice on the bunion for 5 minutes. Repeat 2 to 3 times a day.  Pads  You may want to put a pad over the bunion to cushion it. You can buy bunion pads at most [employer].  Surgery  Wearing wider shoes and padding the bunion may not relieve the pain. Your healthcare provider may then suggest surgery. During surgery, the bunion is shaved away and the bones are put back in a straight line.   Date Last Reviewed: 9/27/2015 © 2000-2016 [employer]. [address] [place] [address]. All rights reserved. This information is not intended as a substitute for professional medical care. Always follow your healthcare professional's instructions.        Toe Extension (Flexibility)    These instructions are for your right foot. Switch sides for your left foot.  1. Sit in a chair. Rest your right ankle on your left knee.  2. Hold your toes with your right hand. Gently bend the toes backward. Feel a stretch in the " undersides of the toes and ball of the foot. Hold for 30 to 60 seconds.  3. Then gently bend the toes in the other direction. Gently press on them until your foot is pointed. Hold for 30 to 60 seconds.  4. Repeat 5 times, or as instructed.  © 6263-4050 [employer]. [address] [place] [address]. All rights reserved. This information is not intended as a substitute for professional medical care. Always follow your healthcare professional's instructions.      Ankle Alphabet (Flexibility)    These instructions are for your right foot. Switch sides for your left foot.  5. Sit on the floor with your legs straight in front of you.  6. Rest your right calf on a rolled-up towel. Use your foot to write the letters of the alphabet in mid-air.  7. Repeat this exercise 3 times a day, or as instructed.  Date Last Reviewed: 3/10/2016  © 5492-7484 [employer]. [address] [place] [address]. All rights reserved. This information is not intended as a substitute for professional medical care. Always follow your healthcare professional's instructions.        Calf Raise (Strength)    8. Stand up straight with both feet flat on the floor, slightly apart. Hold onto a sturdy chair, railing, counter, or table.  9. Raise both heels so youre standing on the balls of your feet. Dont lock your knees or arch your back. Hold for 5 seconds. Then slowly lower your heels back down to the floor.  10. Repeat 10 times, or as instructed.  11. Do this exercise 3 times a day, or as instructed.     Challenge yourself  As you become stronger, do this exercise on one foot at a time.   Date Last Reviewed: 3/10/2016  © 2716-8675 [employer]. [address] [place] [address]. All rights reserved. This information is not intended as a substitute for professional medical care. Always follow your healthcare professional's instructions.        Bent-Knee Calf Stretch  This exercise is  designed to stretch and strengthen your feet and ankles. Before beginning the exercise, read through all the instructions. While exercising, breathe normally and dont bounce. If you feel any pain, stop the exercise. If pain persists, inform your healthcare provider:    · Stand an arms length away from a wall. Place the palms of your hands on the wall. Step forward about 12 inches with your ______ foot.  · Keeping toes pointed forward and both heels on the floor, bend both knees and lean forward. Hold for ______ seconds. Relax.  · Repeat ______ times. Do ______ sets a day.  Date Last Reviewed: 8/16/2015  © 7424-6951 [employer]. [address] [place] [address]. All rights reserved. This information is not intended as a substitute for professional medical care. Always follow your healthcare professional's instructions.        Arch retraining    These exercises are for your right foot. Switch sides for your left foot.  12. Sit in a chair or stand with both feet flat on the floor. Press down with the ball of your right foot, but only on the left side of the foot, just under the big toe.  13. Then pull the bottom of your big toe back toward your heel. This should pull up the arch of your foot. Dont flex your toes while doing this. It is a subtle movement of the arch.  14. Hold for 5 seconds. Relax.  Date Last Reviewed: 3/10/2016  © 9653-6242 [employer]. [address] [place] [address]. All rights reserved. This information is not intended as a substitute for professional medical care. Always follow your healthcare professional's instructions.        Ankle Dorsiflexion/Plantarflexion (Flexibility)    15. Sit on the floor or in bed with your legs straight in front of you.  16. Point both feet. Then flex both feet.  17. Do this 10 to 30 times in a row.  18. Repeat this exercise 2 times a day, or as instructed.  Date Last Reviewed: 5/1/2016  © 8250-6140 [employer]. [address] [place] [address], [place] [address]. All rights reserved. This information is not intended as a substitute for professional medical care. Always follow your healthcare professional's instructions.

## 2019-11-26 ENCOUNTER — OCCUPATIONAL HEALTH (OUTPATIENT)
Dept: URGENT CARE | Facility: CLINIC | Age: 24
End: 2019-11-26

## 2019-11-26 DIAGNOSIS — Z02.83 ENCOUNTER FOR DRUG SCREENING: Primary | ICD-10-CM

## 2019-11-26 PROCEDURE — 80305 POCT RAPID DRUG SCREEN 10 PANEL: ICD-10-PCS | Mod: QW,S$GLB,, | Performed by: NURSE PRACTITIONER

## 2019-11-26 PROCEDURE — 80305 DRUG TEST PRSMV DIR OPT OBS: CPT | Mod: QW,S$GLB,, | Performed by: NURSE PRACTITIONER

## 2019-12-02 LAB
CTP QC/QA: YES
POC 10 PANEL DRUG SCREEN: NEGATIVE

## 2020-10-05 ENCOUNTER — PATIENT MESSAGE (OUTPATIENT)
Dept: ADMINISTRATIVE | Facility: HOSPITAL | Age: 25
End: 2020-10-05

## 2022-06-01 ENCOUNTER — OFFICE VISIT (OUTPATIENT)
Dept: PSYCHIATRY | Facility: CLINIC | Age: 27
End: 2022-06-01
Payer: COMMERCIAL

## 2022-06-01 VITALS
SYSTOLIC BLOOD PRESSURE: 111 MMHG | WEIGHT: 149.13 LBS | HEIGHT: 69 IN | BODY MASS INDEX: 22.09 KG/M2 | HEART RATE: 102 BPM | DIASTOLIC BLOOD PRESSURE: 73 MMHG

## 2022-06-01 DIAGNOSIS — F33.1 MODERATE EPISODE OF RECURRENT MAJOR DEPRESSIVE DISORDER: Primary | ICD-10-CM

## 2022-06-01 DIAGNOSIS — F41.1 GENERALIZED ANXIETY DISORDER: ICD-10-CM

## 2022-06-01 PROCEDURE — 90792 PSYCH DIAG EVAL W/MED SRVCS: CPT | Mod: S$GLB,,, | Performed by: PHYSICIAN ASSISTANT

## 2022-06-01 PROCEDURE — 99999 PR PBB SHADOW E&M-EST. PATIENT-LVL III: CPT | Mod: PBBFAC,,, | Performed by: PHYSICIAN ASSISTANT

## 2022-06-01 PROCEDURE — 3074F SYST BP LT 130 MM HG: CPT | Mod: CPTII,S$GLB,, | Performed by: PHYSICIAN ASSISTANT

## 2022-06-01 PROCEDURE — 3078F DIAST BP <80 MM HG: CPT | Mod: CPTII,S$GLB,, | Performed by: PHYSICIAN ASSISTANT

## 2022-06-01 PROCEDURE — 99999 PR PBB SHADOW E&M-EST. PATIENT-LVL III: ICD-10-PCS | Mod: PBBFAC,,, | Performed by: PHYSICIAN ASSISTANT

## 2022-06-01 PROCEDURE — 3078F PR MOST RECENT DIASTOLIC BLOOD PRESSURE < 80 MM HG: ICD-10-PCS | Mod: CPTII,S$GLB,, | Performed by: PHYSICIAN ASSISTANT

## 2022-06-01 PROCEDURE — 3074F PR MOST RECENT SYSTOLIC BLOOD PRESSURE < 130 MM HG: ICD-10-PCS | Mod: CPTII,S$GLB,, | Performed by: PHYSICIAN ASSISTANT

## 2022-06-01 PROCEDURE — 1160F RVW MEDS BY RX/DR IN RCRD: CPT | Mod: CPTII,S$GLB,, | Performed by: PHYSICIAN ASSISTANT

## 2022-06-01 PROCEDURE — 3008F BODY MASS INDEX DOCD: CPT | Mod: CPTII,S$GLB,, | Performed by: PHYSICIAN ASSISTANT

## 2022-06-01 PROCEDURE — 3008F PR BODY MASS INDEX (BMI) DOCUMENTED: ICD-10-PCS | Mod: CPTII,S$GLB,, | Performed by: PHYSICIAN ASSISTANT

## 2022-06-01 PROCEDURE — 1159F MED LIST DOCD IN RCRD: CPT | Mod: CPTII,S$GLB,, | Performed by: PHYSICIAN ASSISTANT

## 2022-06-01 PROCEDURE — 1160F PR REVIEW ALL MEDS BY PRESCRIBER/CLIN PHARMACIST DOCUMENTED: ICD-10-PCS | Mod: CPTII,S$GLB,, | Performed by: PHYSICIAN ASSISTANT

## 2022-06-01 PROCEDURE — 90792 PR PSYCHIATRIC DIAGNOSTIC EVALUATION W/MEDICAL SERVICES: ICD-10-PCS | Mod: S$GLB,,, | Performed by: PHYSICIAN ASSISTANT

## 2022-06-01 PROCEDURE — 1159F PR MEDICATION LIST DOCUMENTED IN MEDICAL RECORD: ICD-10-PCS | Mod: CPTII,S$GLB,, | Performed by: PHYSICIAN ASSISTANT

## 2022-06-01 RX ORDER — ESCITALOPRAM OXALATE 10 MG/1
10 TABLET ORAL DAILY
Qty: 30 TABLET | Refills: 1 | Status: SHIPPED | OUTPATIENT
Start: 2022-06-01 | End: 2022-09-16 | Stop reason: SDUPTHER
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
## 2022-07-12 ENCOUNTER — CLINICAL SUPPORT (OUTPATIENT)
Dept: PSYCHIATRY | Facility: CLINIC | Age: 27
End: 2022-07-12
Payer: COMMERCIAL

## 2022-07-12 ENCOUNTER — OFFICE VISIT (OUTPATIENT)
Dept: PSYCHIATRY | Facility: CLINIC | Age: 27
End: 2022-07-12
Payer: COMMERCIAL

## 2022-07-12 VITALS
WEIGHT: 151 LBS | HEART RATE: 97 BPM | BODY MASS INDEX: 22.3 KG/M2 | SYSTOLIC BLOOD PRESSURE: 108 MMHG | DIASTOLIC BLOOD PRESSURE: 73 MMHG

## 2022-07-12 DIAGNOSIS — F90.2 ADHD (ATTENTION DEFICIT HYPERACTIVITY DISORDER), COMBINED TYPE: Primary | ICD-10-CM

## 2022-07-12 DIAGNOSIS — F41.1 GENERALIZED ANXIETY DISORDER: ICD-10-CM

## 2022-07-12 PROCEDURE — 99999 PR PBB SHADOW E&M-EST. PATIENT-LVL II: CPT | Mod: PBBFAC,,, | Performed by: PHYSICIAN ASSISTANT

## 2022-07-12 PROCEDURE — 3078F PR MOST RECENT DIASTOLIC BLOOD PRESSURE < 80 MM HG: ICD-10-PCS | Mod: CPTII,S$GLB,, | Performed by: PHYSICIAN ASSISTANT

## 2022-07-12 PROCEDURE — 3074F SYST BP LT 130 MM HG: CPT | Mod: CPTII,S$GLB,, | Performed by: PHYSICIAN ASSISTANT

## 2022-07-12 PROCEDURE — 80307 DRUG TEST PRSMV CHEM ANLYZR: CPT | Performed by: PHYSICIAN ASSISTANT

## 2022-07-12 PROCEDURE — 3008F BODY MASS INDEX DOCD: CPT | Mod: CPTII,S$GLB,, | Performed by: PHYSICIAN ASSISTANT

## 2022-07-12 PROCEDURE — 3078F DIAST BP <80 MM HG: CPT | Mod: CPTII,S$GLB,, | Performed by: PHYSICIAN ASSISTANT

## 2022-07-12 PROCEDURE — 3008F PR BODY MASS INDEX (BMI) DOCUMENTED: ICD-10-PCS | Mod: CPTII,S$GLB,, | Performed by: PHYSICIAN ASSISTANT

## 2022-07-12 PROCEDURE — 99214 OFFICE O/P EST MOD 30 MIN: CPT | Mod: S$GLB,,, | Performed by: PHYSICIAN ASSISTANT

## 2022-07-12 PROCEDURE — 99999 PR PBB SHADOW E&M-EST. PATIENT-LVL II: ICD-10-PCS | Mod: PBBFAC,,, | Performed by: PHYSICIAN ASSISTANT

## 2022-07-12 PROCEDURE — 99214 PR OFFICE/OUTPT VISIT, EST, LEVL IV, 30-39 MIN: ICD-10-PCS | Mod: S$GLB,,, | Performed by: PHYSICIAN ASSISTANT

## 2022-07-12 PROCEDURE — 3074F PR MOST RECENT SYSTOLIC BLOOD PRESSURE < 130 MM HG: ICD-10-PCS | Mod: CPTII,S$GLB,, | Performed by: PHYSICIAN ASSISTANT

## 2022-07-12 RX ORDER — DEXTROAMPHETAMINE SACCHARATE, AMPHETAMINE ASPARTATE MONOHYDRATE, DEXTROAMPHETAMINE SULFATE AND AMPHETAMINE SULFATE 2.5; 2.5; 2.5; 2.5 MG/1; MG/1; MG/1; MG/1
10 CAPSULE, EXTENDED RELEASE ORAL EVERY MORNING
Qty: 10 CAPSULE | Refills: 0 | Status: SHIPPED | OUTPATIENT
Start: 2022-07-12 | End: 2022-07-27 | Stop reason: SDUPTHER

## 2022-07-12 NOTE — PROGRESS NOTES
"Outpatient Psychiatry Follow-Up Visit (PA)    07/12/2022    Clinical Status of Patient:  Outpatient (Ambulatory)    Chief Complaint:  Milka Smith is a 27 y.o. female who presents today for follow-up of anxiety.  Met with patient.     Current Medications:   lexapro 10 mg    Interval History and Content of Current Session:  Patient seen and chart reviewed. Last seen on 6/01/2022    Patient has a psychiatric history of: MDD, BRET    Pt presents for follow up today after starting lexapro 10 mg    The patient reports her mood is the same. She denies depression; she denies feeling sad or worthless, but continues to endorse lack of activity or productivity.     She reports her anxiety has resolved. She denies feeling nervous throughout the day and is no longer having panic attacks.     She denies adverse effects from medication    The patient reports, however, that she feels her main issue is ADHD. She states she "came across the signs of ADHD and I felt like everything fit... everything literalyl fit me all the way... I have been on SSRIs since I was 16 and maybe that's why SSRIs don't fit me."    The patient reports she is unable to complete a task, finding that when she starts something she feels she just can't finish it. She reports she doesn't ever read directions and finds herself taking short cuts at work as her focus fades. She states she isn't able to finish movies as her attention fades before it is over.  She has noticed that she is repetitive in conversation and states her friends often tell her to calm down. She is unable to multi-task and states "I can't walk and talk at the same time." Patient is fidgety and leg shakes throughout appointment.     Patient reports attention deficit hyperactivity symptoms that have continued through adulthood of overlooking details, having trouble focusing on assignments and conversations, being distracted during conversations, being disorganized, having trouble, " "avoiding lengthy mental tasks, being easily distracted, forgetfulness, and restless and fidgetiness.  Problems happen at home, the community, and occupationally. These symptoms have been happening over patient's entire life.     Patient denies any history of heart palpitations, syncope, dizziness, dyspnea on exertion, shortness of breath, or chest pain. He denies any family history of arrhythmias, enlarged heart, or sudden cardiac death.    Patient is hyper-verbal and fidgety throughout this and previous appointment. This provider suspected ADHD at previous appointment but first addressed anxiety.     The patient sleeps but reports chronic fatigue during the day.   She reports she has an appetite but doesn't eat much as she doesn't want to go to the process of going to the store and cooking.     Denies SI/HI/AVH.     Adult ADHD: Part A: 21 Part B: 40    Psychotherapy:  · Target symptoms: distractability, lack of focus, anxiety   · Why chosen therapy is appropriate versus another modality: relevant to diagnosis, evidence based practice  · Outcome monitoring methods: self-report  · Therapeutic intervention type: supportive psychotherapy  · Topics discussed/themes: building skills sets for symptom management, symptom recognition  · The patient's response to the intervention is accepting. The patient's progress toward treatment goals is fair.   · Duration of intervention: 17 minutes.    Review of Systems   · PSYCHIATRIC: Pertinant items are noted in the narrative.    Past Medication Trials:  "SSRIs"    Past Medical, Family and Social History: The patient's past medical, family and social history have been reviewed and updated as appropriate within the electronic medical record - see encounter notes.    Compliance: yes    Side effects: None    Risk Parameters:  Patient reports no suicidal ideation  Patient reports no homicidal ideation  Patient reports no self-injurious behavior  Patient reports no violent " behavior    Exam (detailed: at least 9 elements; comprehensive: all 15 elements)   Constitutional  Vitals:  Most recent vital signs, dated less than 90 days prior to this appointment, were reviewed.   Vitals:    07/12/22 0913   BP: 108/73   Pulse: 97   Weight: 68.5 kg (151 lb 0.2 oz)        General:  unremarkable, age appropriate, casually dressed, neatly groomed     Musculoskeletal  Muscle Strength/Tone:  not examined   Gait & Station:  non-ataxic     Psychiatric  Speech:  pressured   Mood & Affect:  steady  congruent and appropriate   Thought Process:  normal and logical   Associations:  intact   Thought Content:  normal, no suicidality, no homicidality, delusions, or paranoia   Insight:  intact   Judgement: behavior is adequate to circumstances   Orientation:  grossly intact   Memory: intact for content of interview   Language: grossly intact   Attention Span & Concentration:  able to focus   Fund of Knowledge:  intact and appropriate to age and level of education     Assessment and Diagnosis   Status/Progress: Based on the examination today, the patient's problem(s) is/are inadequately controlled.  New problems have been presented today.   Lack of compliance are not complicating management of the primary condition.  The working differential for this patient includes ADHD combined type.     General Impression: Patient is a 27 year old female with a psychiatric history of anxiety presenting today with complaints of inattention and focus. Patient's symptoms are consistent with ADHD combined type, patient is hyperverbal and restless in both appointments.       ICD-10-CM ICD-9-CM    1. ADHD (attention deficit hyperactivity disorder), combined type  F90.2 314.01 Toxicology screen, urine      EKG 12-lead      dextroamphetamine-amphetamine (ADDERALL XR) 10 MG 24 hr capsule   2. Generalized anxiety disorder  F41.1 300.02        Intervention/Counseling/Treatment Plan   · Medication Management: The risks and benefits of  medication were discussed with the patient.   · Continue lexapro 10 mg  · Start Adderall XR 10 mg   · EKG ordered  · UDS ordered  · Discussed diagnosis, risk and benefits of proposed treatment above vs alternative treatment vs no treatment, and potential side effects of these treatments, and the inherent unpredictability of individual responses to these treatments. The patient expresses understanding and gives informed consent to pursue treatment at this time, believing that the potential benefits outweigh the potential risks. Patient has no other questions.   · Patient has no contraindications: no h/o allergic rxn, agitation, anxiety, tourette's, arrythmia, cardiovascular disease, cardiac structural abnormalities, hyperthyroidism, glaucoma, Other psychiatric illness, etc.   · Discussed diagnosis, risks and benefits of proposed treatment vs alternative treatments vs no treatment, and potential side effects of these treatments (death, dependency, psychosis, tino, aggression, HTN, ticks, MI, stroke, arrythmia, seizure, anaphylaxis or other allergic reactions, leukopenia, nervousness, anorexia, insomnia, tachycardia, palpitations, dizziness, BP changes, HR changes, visual disturbance, etc.). The patient expresses understanding of the above and displays the capacity to agree with this treatment given said understanding. Patient also agrees that, currently, the benefits outweigh the risks and would like to pursue treatment at this time.  · Patient voices understanding and agreement with this plan  · Encouraged patient to keep future appointments  · Instruct patient to call or message with questions  · In the event of an emergency, including suicidal ideation, patient was advised to go to the emergency room      Return to Clinic: 1 month    Sangita Shepard PA-C

## 2022-07-13 ENCOUNTER — HOSPITAL ENCOUNTER (OUTPATIENT)
Dept: CARDIOLOGY | Facility: CLINIC | Age: 27
Discharge: HOME | End: 2022-07-13
Payer: COMMERCIAL

## 2022-07-13 ENCOUNTER — PATIENT MESSAGE (OUTPATIENT)
Dept: PSYCHIATRY | Facility: CLINIC | Age: 27
End: 2022-07-13
Payer: COMMERCIAL

## 2022-07-13 DIAGNOSIS — F90.2 ADHD (ATTENTION DEFICIT HYPERACTIVITY DISORDER), COMBINED TYPE: ICD-10-CM

## 2022-07-13 LAB
AMPHET+METHAMPHET UR QL: NEGATIVE
BARBITURATES UR QL SCN>200 NG/ML: NEGATIVE
BENZODIAZ UR QL SCN>200 NG/ML: NEGATIVE
BZE UR QL SCN: NEGATIVE
CANNABINOIDS UR QL SCN: NEGATIVE
CREAT UR-MCNC: 192 MG/DL (ref 15–325)
ETHANOL UR-MCNC: <10 MG/DL
METHADONE UR QL SCN>300 NG/ML: NEGATIVE
OPIATES UR QL SCN: NEGATIVE
PCP UR QL SCN>25 NG/ML: NEGATIVE
TOXICOLOGY INFORMATION: NORMAL

## 2022-07-13 PROCEDURE — 93005 EKG 12-LEAD: ICD-10-PCS | Mod: S$GLB,,, | Performed by: PHYSICIAN ASSISTANT

## 2022-07-13 PROCEDURE — 93010 ELECTROCARDIOGRAM REPORT: CPT | Mod: S$GLB,,, | Performed by: INTERNAL MEDICINE

## 2022-07-13 PROCEDURE — 93005 ELECTROCARDIOGRAM TRACING: CPT | Mod: S$GLB,,, | Performed by: PHYSICIAN ASSISTANT

## 2022-07-13 PROCEDURE — 93010 EKG 12-LEAD: ICD-10-PCS | Mod: S$GLB,,, | Performed by: INTERNAL MEDICINE

## 2022-07-14 DIAGNOSIS — F90.2 ADHD (ATTENTION DEFICIT HYPERACTIVITY DISORDER), COMBINED TYPE: Primary | ICD-10-CM

## 2022-07-14 DIAGNOSIS — R94.31 ABNORMAL EKG: Primary | ICD-10-CM

## 2022-07-17 PROBLEM — R94.31 NONSPECIFIC ABNORMAL ELECTROCARDIOGRAM (ECG) (EKG): Status: ACTIVE | Noted: 2022-07-17

## 2022-07-17 PROBLEM — F90.2 ATTENTION DEFICIT HYPERACTIVITY DISORDER (ADHD), COMBINED TYPE: Status: ACTIVE | Noted: 2022-07-17

## 2022-07-17 NOTE — PROGRESS NOTES
Subjective:   Patient ID:  Milka Smith is a 27 y.o. female who presents for evaluation of CVD    HPI:She had mildly abnormal ECG  The patient has no chest pain, SOB, TIA, palpitations, syncope or pre-syncope.Patient does not exercise but very active  Alot.BP good in other offices        Review of Systems   Constitutional: Negative for chills, decreased appetite, diaphoresis, fever, malaise/fatigue, night sweats, weight gain and weight loss.   HENT: Negative for congestion, hoarse voice, nosebleeds, sore throat and tinnitus.    Eyes: Negative for blurred vision, double vision, vision loss in left eye, vision loss in right eye, visual disturbance and visual halos.   Cardiovascular: Negative for chest pain, claudication, cyanosis, dyspnea on exertion, irregular heartbeat, leg swelling, near-syncope, orthopnea, palpitations, paroxysmal nocturnal dyspnea and syncope.   Respiratory: Negative for cough, hemoptysis, shortness of breath, sleep disturbances due to breathing, snoring, sputum production and wheezing.    Endocrine: Negative for cold intolerance, heat intolerance, polydipsia, polyphagia and polyuria.   Hematologic/Lymphatic: Negative for adenopathy and bleeding problem. Does not bruise/bleed easily.   Skin: Negative for color change, dry skin, flushing, itching, nail changes, poor wound healing, rash, skin cancer, suspicious lesions and unusual hair distribution.   Musculoskeletal: Negative for arthritis, back pain, falls, gout, joint pain, joint swelling, muscle cramps, muscle weakness, myalgias and stiffness.   Gastrointestinal: Negative for abdominal pain, anorexia, change in bowel habit, constipation, diarrhea, dysphagia, heartburn, hematemesis, hematochezia, melena and vomiting.   Genitourinary: Negative for decreased libido, dysuria, hematuria, hesitancy and urgency.   Neurological: Negative for excessive daytime sleepiness, dizziness, focal weakness, headaches, light-headedness, loss of balance,  "numbness, paresthesias, seizures, sensory change, tremors, vertigo and weakness.   Psychiatric/Behavioral: Negative for altered mental status, depression, hallucinations, memory loss, substance abuse and suicidal ideas. The patient does not have insomnia and is not nervous/anxious.    Allergic/Immunologic: Negative for environmental allergies and hives.       Objective: /84 (BP Location: Left arm, Patient Position: Sitting, BP Method: Medium (Automatic))   Pulse 107   Ht 5' 9" (1.753 m)   Wt 65.1 kg (143 lb 8.3 oz)   BMI 21.19 kg/m²      Physical Exam  Constitutional:       Appearance: She is well-developed.   HENT:      Head: Normocephalic.   Eyes:      Pupils: Pupils are equal, round, and reactive to light.   Neck:      Thyroid: No thyromegaly.      Vascular: Normal carotid pulses. No carotid bruit, hepatojugular reflux or JVD.   Cardiovascular:      Rate and Rhythm: Normal rate and regular rhythm.      Pulses: Intact distal pulses.      Heart sounds: Normal heart sounds. No murmur heard.    No friction rub. No gallop.   Pulmonary:      Effort: Pulmonary effort is normal. No tachypnea or respiratory distress.      Breath sounds: Normal breath sounds. No wheezing or rales.   Chest:      Chest wall: No tenderness.   Abdominal:      General: Bowel sounds are normal. There is no distension.      Palpations: Abdomen is soft. There is no mass.      Tenderness: There is no abdominal tenderness. There is no guarding or rebound.   Musculoskeletal:         General: No tenderness. Normal range of motion.      Cervical back: Normal range of motion.   Lymphadenopathy:      Cervical: No cervical adenopathy.   Skin:     General: Skin is warm.      Findings: No erythema or rash.   Neurological:      Mental Status: She is alert and oriented to person, place, and time.      Cranial Nerves: No cranial nerve deficit.      Coordination: Coordination normal.   Psychiatric:         Behavior: Behavior normal.         Thought " Content: Thought content normal.         Judgment: Judgment normal.         Assessment:     1. Abnormal EKG    2. Nonspecific abnormal electrocardiogram (ECG) (EKG)    3. Moderate episode of recurrent major depressive disorder    4. Attention deficit hyperactivity disorder (ADHD), combined type    5. Tachycardia        Plan:   Discussed diet , achieving and maintaining ideal body weight, and exercise.   We reviewed meds in detail.  Reassured-Discussed goals, options, plan.  Can certainly take the Lexapro and take as low dose of the Adderal as needed-Omega-3 > 800 mg EPA/DHA/ day and exercise can help heart and ADHD  Could repeat blood work and CFD  Best to note vape or minimally  Milka was seen today for establish care and abnormal ecg.    Diagnoses and all orders for this visit:    Abnormal EKG  -     Ambulatory referral/consult to Cardiology  -     Lipid Panel; Future; Expected date: 07/18/2022  -     Comprehensive Metabolic Panel; Future; Expected date: 07/18/2022  -     TSH; Future; Expected date: 07/18/2022  -     CBC Auto Differential; Future; Expected date: 07/18/2022  -     BNP; Future; Expected date: 07/18/2022  -     Echo  -     EKG 12-lead; Future    Nonspecific abnormal electrocardiogram (ECG) (EKG)  -     Lipid Panel; Future; Expected date: 07/18/2022  -     Comprehensive Metabolic Panel; Future; Expected date: 07/18/2022  -     TSH; Future; Expected date: 07/18/2022  -     CBC Auto Differential; Future; Expected date: 07/18/2022  -     BNP; Future; Expected date: 07/18/2022  -     Echo  -     EKG 12-lead; Future    Moderate episode of recurrent major depressive disorder  -     TSH; Future; Expected date: 07/18/2022  -     CBC Auto Differential; Future; Expected date: 07/18/2022    Attention deficit hyperactivity disorder (ADHD), combined type    Tachycardia  -     BNP; Future; Expected date: 07/18/2022  -     Echo  -     EKG 12-lead; Future            Follow up for ECG and labs now; CFD Jax stewart  Jax to read soon.

## 2022-07-18 ENCOUNTER — LAB VISIT (OUTPATIENT)
Dept: LAB | Facility: HOSPITAL | Age: 27
End: 2022-07-18
Attending: INTERNAL MEDICINE
Payer: COMMERCIAL

## 2022-07-18 ENCOUNTER — HOSPITAL ENCOUNTER (OUTPATIENT)
Dept: CARDIOLOGY | Facility: CLINIC | Age: 27
Discharge: HOME | End: 2022-07-18
Payer: COMMERCIAL

## 2022-07-18 ENCOUNTER — OFFICE VISIT (OUTPATIENT)
Dept: CARDIOLOGY | Facility: CLINIC | Age: 27
End: 2022-07-18
Payer: COMMERCIAL

## 2022-07-18 VITALS
HEART RATE: 107 BPM | SYSTOLIC BLOOD PRESSURE: 139 MMHG | BODY MASS INDEX: 21.25 KG/M2 | DIASTOLIC BLOOD PRESSURE: 84 MMHG | WEIGHT: 143.5 LBS | HEIGHT: 69 IN

## 2022-07-18 DIAGNOSIS — R00.0 TACHYCARDIA: ICD-10-CM

## 2022-07-18 DIAGNOSIS — R94.31 ABNORMAL EKG: Primary | ICD-10-CM

## 2022-07-18 DIAGNOSIS — R94.31 ABNORMAL EKG: ICD-10-CM

## 2022-07-18 DIAGNOSIS — F33.1 MODERATE EPISODE OF RECURRENT MAJOR DEPRESSIVE DISORDER: ICD-10-CM

## 2022-07-18 DIAGNOSIS — R94.31 NONSPECIFIC ABNORMAL ELECTROCARDIOGRAM (ECG) (EKG): ICD-10-CM

## 2022-07-18 DIAGNOSIS — F90.2 ATTENTION DEFICIT HYPERACTIVITY DISORDER (ADHD), COMBINED TYPE: ICD-10-CM

## 2022-07-18 DIAGNOSIS — R03.0 ELEVATED BLOOD PRESSURE READING WITHOUT DIAGNOSIS OF HYPERTENSION: ICD-10-CM

## 2022-07-18 LAB
ALBUMIN SERPL BCP-MCNC: 3.8 G/DL (ref 3.5–5.2)
ALP SERPL-CCNC: 83 U/L (ref 55–135)
ALT SERPL W/O P-5'-P-CCNC: 11 U/L (ref 10–44)
ANION GAP SERPL CALC-SCNC: 10 MMOL/L (ref 8–16)
AST SERPL-CCNC: 16 U/L (ref 10–40)
BASOPHILS # BLD AUTO: 0.03 K/UL (ref 0–0.2)
BASOPHILS NFR BLD: 0.9 % (ref 0–1.9)
BILIRUB SERPL-MCNC: 0.3 MG/DL (ref 0.1–1)
BNP SERPL-MCNC: 54 PG/ML (ref 0–99)
BUN SERPL-MCNC: 12 MG/DL (ref 6–20)
CALCIUM SERPL-MCNC: 9.2 MG/DL (ref 8.7–10.5)
CHLORIDE SERPL-SCNC: 101 MMOL/L (ref 95–110)
CHOLEST SERPL-MCNC: 254 MG/DL (ref 120–199)
CHOLEST/HDLC SERPL: 2.4 {RATIO} (ref 2–5)
CO2 SERPL-SCNC: 28 MMOL/L (ref 23–29)
CREAT SERPL-MCNC: 1.1 MG/DL (ref 0.5–1.4)
DIFFERENTIAL METHOD: ABNORMAL
EOSINOPHIL # BLD AUTO: 0 K/UL (ref 0–0.5)
EOSINOPHIL NFR BLD: 0 % (ref 0–8)
ERYTHROCYTE [DISTWIDTH] IN BLOOD BY AUTOMATED COUNT: 14.1 % (ref 11.5–14.5)
EST. GFR  (AFRICAN AMERICAN): >60 ML/MIN/1.73 M^2
EST. GFR  (NON AFRICAN AMERICAN): >60 ML/MIN/1.73 M^2
GLUCOSE SERPL-MCNC: 87 MG/DL (ref 70–110)
HCT VFR BLD AUTO: 39 % (ref 37–48.5)
HDLC SERPL-MCNC: 106 MG/DL (ref 40–75)
HDLC SERPL: 41.7 % (ref 20–50)
HGB BLD-MCNC: 13.1 G/DL (ref 12–16)
IMM GRANULOCYTES # BLD AUTO: 0 K/UL (ref 0–0.04)
IMM GRANULOCYTES NFR BLD AUTO: 0 % (ref 0–0.5)
LDLC SERPL CALC-MCNC: 136.6 MG/DL (ref 63–159)
LYMPHOCYTES # BLD AUTO: 1.6 K/UL (ref 1–4.8)
LYMPHOCYTES NFR BLD: 50.2 % (ref 18–48)
MCH RBC QN AUTO: 31.4 PG (ref 27–31)
MCHC RBC AUTO-ENTMCNC: 33.6 G/DL (ref 32–36)
MCV RBC AUTO: 94 FL (ref 82–98)
MONOCYTES # BLD AUTO: 0.4 K/UL (ref 0.3–1)
MONOCYTES NFR BLD: 11.6 % (ref 4–15)
NEUTROPHILS # BLD AUTO: 1.2 K/UL (ref 1.8–7.7)
NEUTROPHILS NFR BLD: 37.3 % (ref 38–73)
NONHDLC SERPL-MCNC: 148 MG/DL
NRBC BLD-RTO: 0 /100 WBC
PLATELET # BLD AUTO: 266 K/UL (ref 150–450)
PMV BLD AUTO: 10.5 FL (ref 9.2–12.9)
POTASSIUM SERPL-SCNC: 4.2 MMOL/L (ref 3.5–5.1)
PROT SERPL-MCNC: 7.9 G/DL (ref 6–8.4)
RBC # BLD AUTO: 4.17 M/UL (ref 4–5.4)
SODIUM SERPL-SCNC: 139 MMOL/L (ref 136–145)
TRIGL SERPL-MCNC: 57 MG/DL (ref 30–150)
TSH SERPL DL<=0.005 MIU/L-ACNC: 0.82 UIU/ML (ref 0.4–4)
WBC # BLD AUTO: 3.27 K/UL (ref 3.9–12.7)

## 2022-07-18 PROCEDURE — 3075F PR MOST RECENT SYSTOLIC BLOOD PRESS GE 130-139MM HG: ICD-10-PCS | Mod: CPTII,S$GLB,, | Performed by: INTERNAL MEDICINE

## 2022-07-18 PROCEDURE — 85025 COMPLETE CBC W/AUTO DIFF WBC: CPT | Performed by: INTERNAL MEDICINE

## 2022-07-18 PROCEDURE — 93010 ELECTROCARDIOGRAM REPORT: CPT | Mod: S$GLB,,, | Performed by: INTERNAL MEDICINE

## 2022-07-18 PROCEDURE — 3075F SYST BP GE 130 - 139MM HG: CPT | Mod: CPTII,S$GLB,, | Performed by: INTERNAL MEDICINE

## 2022-07-18 PROCEDURE — 93010 EKG 12-LEAD: ICD-10-PCS | Mod: S$GLB,,, | Performed by: INTERNAL MEDICINE

## 2022-07-18 PROCEDURE — 80061 LIPID PANEL: CPT | Performed by: INTERNAL MEDICINE

## 2022-07-18 PROCEDURE — 1159F PR MEDICATION LIST DOCUMENTED IN MEDICAL RECORD: ICD-10-PCS | Mod: CPTII,S$GLB,, | Performed by: INTERNAL MEDICINE

## 2022-07-18 PROCEDURE — 36415 COLL VENOUS BLD VENIPUNCTURE: CPT | Performed by: INTERNAL MEDICINE

## 2022-07-18 PROCEDURE — 83880 ASSAY OF NATRIURETIC PEPTIDE: CPT | Performed by: INTERNAL MEDICINE

## 2022-07-18 PROCEDURE — 99244 OFF/OP CNSLTJ NEW/EST MOD 40: CPT | Mod: S$GLB,,, | Performed by: INTERNAL MEDICINE

## 2022-07-18 PROCEDURE — 99999 PR PBB SHADOW E&M-EST. PATIENT-LVL IV: CPT | Mod: PBBFAC,,, | Performed by: INTERNAL MEDICINE

## 2022-07-18 PROCEDURE — 93005 ELECTROCARDIOGRAM TRACING: CPT | Mod: S$GLB,,, | Performed by: INTERNAL MEDICINE

## 2022-07-18 PROCEDURE — 80053 COMPREHEN METABOLIC PANEL: CPT | Performed by: INTERNAL MEDICINE

## 2022-07-18 PROCEDURE — 99999 PR PBB SHADOW E&M-EST. PATIENT-LVL IV: ICD-10-PCS | Mod: PBBFAC,,, | Performed by: INTERNAL MEDICINE

## 2022-07-18 PROCEDURE — 93005 EKG 12-LEAD: ICD-10-PCS | Mod: S$GLB,,, | Performed by: INTERNAL MEDICINE

## 2022-07-18 PROCEDURE — 3079F DIAST BP 80-89 MM HG: CPT | Mod: CPTII,S$GLB,, | Performed by: INTERNAL MEDICINE

## 2022-07-18 PROCEDURE — 3008F BODY MASS INDEX DOCD: CPT | Mod: CPTII,S$GLB,, | Performed by: INTERNAL MEDICINE

## 2022-07-18 PROCEDURE — 99244 PR OFFICE CONSULTATION,LEVEL IV: ICD-10-PCS | Mod: S$GLB,,, | Performed by: INTERNAL MEDICINE

## 2022-07-18 PROCEDURE — 3008F PR BODY MASS INDEX (BMI) DOCUMENTED: ICD-10-PCS | Mod: CPTII,S$GLB,, | Performed by: INTERNAL MEDICINE

## 2022-07-18 PROCEDURE — 1159F MED LIST DOCD IN RCRD: CPT | Mod: CPTII,S$GLB,, | Performed by: INTERNAL MEDICINE

## 2022-07-18 PROCEDURE — 3079F PR MOST RECENT DIASTOLIC BLOOD PRESSURE 80-89 MM HG: ICD-10-PCS | Mod: CPTII,S$GLB,, | Performed by: INTERNAL MEDICINE

## 2022-07-18 PROCEDURE — 84443 ASSAY THYROID STIM HORMONE: CPT | Performed by: INTERNAL MEDICINE

## 2022-07-18 NOTE — PATIENT INSTRUCTIONS
Discussed diet , achieving and maintaining ideal body weight, and exercise.   We reviewed meds in detail.  Reassured-Discussed goals, options, plan.  Can certainly take the Lexapro and take as low dose of the Adderal as needed-Omega-3 > 800 mg EPA/DHA/ day and exercise can help heart and ADHD  Could repeat blood work and CFD  Best to note vape or minimally

## 2022-07-27 ENCOUNTER — PATIENT MESSAGE (OUTPATIENT)
Dept: PSYCHIATRY | Facility: CLINIC | Age: 27
End: 2022-07-27
Payer: COMMERCIAL

## 2022-07-27 DIAGNOSIS — F90.2 ADHD (ATTENTION DEFICIT HYPERACTIVITY DISORDER), COMBINED TYPE: ICD-10-CM

## 2022-07-27 RX ORDER — DEXTROAMPHETAMINE SACCHARATE, AMPHETAMINE ASPARTATE MONOHYDRATE, DEXTROAMPHETAMINE SULFATE AND AMPHETAMINE SULFATE 2.5; 2.5; 2.5; 2.5 MG/1; MG/1; MG/1; MG/1
10 CAPSULE, EXTENDED RELEASE ORAL EVERY MORNING
Qty: 30 CAPSULE | Refills: 0 | Status: SHIPPED | OUTPATIENT
Start: 2022-07-27 | End: 2022-09-07 | Stop reason: SDUPTHER

## 2022-07-31 ENCOUNTER — PATIENT MESSAGE (OUTPATIENT)
Dept: CARDIOLOGY | Facility: CLINIC | Age: 27
End: 2022-07-31
Payer: COMMERCIAL

## 2022-09-07 ENCOUNTER — PATIENT MESSAGE (OUTPATIENT)
Dept: PSYCHIATRY | Facility: CLINIC | Age: 27
End: 2022-09-07
Payer: COMMERCIAL

## 2022-09-07 DIAGNOSIS — F90.2 ADHD (ATTENTION DEFICIT HYPERACTIVITY DISORDER), COMBINED TYPE: ICD-10-CM

## 2022-09-07 RX ORDER — DEXTROAMPHETAMINE SACCHARATE, AMPHETAMINE ASPARTATE MONOHYDRATE, DEXTROAMPHETAMINE SULFATE AND AMPHETAMINE SULFATE 2.5; 2.5; 2.5; 2.5 MG/1; MG/1; MG/1; MG/1
10 CAPSULE, EXTENDED RELEASE ORAL EVERY MORNING
Qty: 30 CAPSULE | Refills: 0 | Status: SHIPPED | OUTPATIENT
Start: 2022-09-07 | End: 2022-09-16 | Stop reason: SDUPTHER

## 2022-09-16 ENCOUNTER — OFFICE VISIT (OUTPATIENT)
Dept: PSYCHIATRY | Facility: CLINIC | Age: 27
End: 2022-09-16
Payer: COMMERCIAL

## 2022-09-16 VITALS
SYSTOLIC BLOOD PRESSURE: 118 MMHG | HEART RATE: 94 BPM | WEIGHT: 152 LBS | DIASTOLIC BLOOD PRESSURE: 78 MMHG | BODY MASS INDEX: 22.45 KG/M2

## 2022-09-16 DIAGNOSIS — F90.2 ADHD (ATTENTION DEFICIT HYPERACTIVITY DISORDER), COMBINED TYPE: ICD-10-CM

## 2022-09-16 DIAGNOSIS — F41.1 GENERALIZED ANXIETY DISORDER: Primary | ICD-10-CM

## 2022-09-16 PROCEDURE — 3008F PR BODY MASS INDEX (BMI) DOCUMENTED: ICD-10-PCS | Mod: CPTII,S$GLB,, | Performed by: PHYSICIAN ASSISTANT

## 2022-09-16 PROCEDURE — 1159F MED LIST DOCD IN RCRD: CPT | Mod: CPTII,S$GLB,, | Performed by: PHYSICIAN ASSISTANT

## 2022-09-16 PROCEDURE — 3074F PR MOST RECENT SYSTOLIC BLOOD PRESSURE < 130 MM HG: ICD-10-PCS | Mod: CPTII,S$GLB,, | Performed by: PHYSICIAN ASSISTANT

## 2022-09-16 PROCEDURE — 3078F DIAST BP <80 MM HG: CPT | Mod: CPTII,S$GLB,, | Performed by: PHYSICIAN ASSISTANT

## 2022-09-16 PROCEDURE — 1159F PR MEDICATION LIST DOCUMENTED IN MEDICAL RECORD: ICD-10-PCS | Mod: CPTII,S$GLB,, | Performed by: PHYSICIAN ASSISTANT

## 2022-09-16 PROCEDURE — 3008F BODY MASS INDEX DOCD: CPT | Mod: CPTII,S$GLB,, | Performed by: PHYSICIAN ASSISTANT

## 2022-09-16 PROCEDURE — 3074F SYST BP LT 130 MM HG: CPT | Mod: CPTII,S$GLB,, | Performed by: PHYSICIAN ASSISTANT

## 2022-09-16 PROCEDURE — 3078F PR MOST RECENT DIASTOLIC BLOOD PRESSURE < 80 MM HG: ICD-10-PCS | Mod: CPTII,S$GLB,, | Performed by: PHYSICIAN ASSISTANT

## 2022-09-16 PROCEDURE — 99999 PR PBB SHADOW E&M-EST. PATIENT-LVL II: CPT | Mod: PBBFAC,,, | Performed by: PHYSICIAN ASSISTANT

## 2022-09-16 PROCEDURE — 99213 PR OFFICE/OUTPT VISIT, EST, LEVL III, 20-29 MIN: ICD-10-PCS | Mod: S$GLB,,, | Performed by: PHYSICIAN ASSISTANT

## 2022-09-16 PROCEDURE — 1160F PR REVIEW ALL MEDS BY PRESCRIBER/CLIN PHARMACIST DOCUMENTED: ICD-10-PCS | Mod: CPTII,S$GLB,, | Performed by: PHYSICIAN ASSISTANT

## 2022-09-16 PROCEDURE — 1160F RVW MEDS BY RX/DR IN RCRD: CPT | Mod: CPTII,S$GLB,, | Performed by: PHYSICIAN ASSISTANT

## 2022-09-16 PROCEDURE — 99999 PR PBB SHADOW E&M-EST. PATIENT-LVL II: ICD-10-PCS | Mod: PBBFAC,,, | Performed by: PHYSICIAN ASSISTANT

## 2022-09-16 PROCEDURE — 99213 OFFICE O/P EST LOW 20 MIN: CPT | Mod: S$GLB,,, | Performed by: PHYSICIAN ASSISTANT

## 2022-09-16 RX ORDER — DEXTROAMPHETAMINE SACCHARATE, AMPHETAMINE ASPARTATE MONOHYDRATE, DEXTROAMPHETAMINE SULFATE AND AMPHETAMINE SULFATE 2.5; 2.5; 2.5; 2.5 MG/1; MG/1; MG/1; MG/1
10 CAPSULE, EXTENDED RELEASE ORAL EVERY MORNING
Qty: 30 CAPSULE | Refills: 0 | Status: SHIPPED | OUTPATIENT
Start: 2022-11-08 | End: 2023-01-25 | Stop reason: DRUGHIGH

## 2022-09-16 RX ORDER — ESCITALOPRAM OXALATE 10 MG/1
10 TABLET ORAL DAILY
Qty: 90 TABLET | Refills: 0 | Status: SHIPPED | OUTPATIENT
Start: 2022-09-16 | End: 2022-12-14 | Stop reason: SDUPTHER

## 2022-09-16 RX ORDER — DEXTROAMPHETAMINE SACCHARATE, AMPHETAMINE ASPARTATE MONOHYDRATE, DEXTROAMPHETAMINE SULFATE AND AMPHETAMINE SULFATE 2.5; 2.5; 2.5; 2.5 MG/1; MG/1; MG/1; MG/1
10 CAPSULE, EXTENDED RELEASE ORAL EVERY MORNING
Qty: 30 CAPSULE | Refills: 0 | Status: SHIPPED | OUTPATIENT
Start: 2022-12-08 | End: 2023-01-25 | Stop reason: DRUGHIGH

## 2022-09-16 RX ORDER — DEXTROAMPHETAMINE SACCHARATE, AMPHETAMINE ASPARTATE MONOHYDRATE, DEXTROAMPHETAMINE SULFATE AND AMPHETAMINE SULFATE 2.5; 2.5; 2.5; 2.5 MG/1; MG/1; MG/1; MG/1
10 CAPSULE, EXTENDED RELEASE ORAL EVERY MORNING
Qty: 30 CAPSULE | Refills: 0 | Status: SHIPPED | OUTPATIENT
Start: 2022-10-08 | End: 2023-01-25 | Stop reason: DRUGHIGH

## 2022-09-16 NOTE — PROGRESS NOTES
"Outpatient Psychiatry Follow-Up Visit (PA)    09/16/2022    Clinical Status of Patient:  Outpatient (Ambulatory)    Chief Complaint:  Milka Smith is a 27 y.o. female who presents today for follow-up of anxiety.  Met with patient.     Current Medications:   lexapro 10 mg  Adderall XR 10 mg    Interval History and Content of Current Session:  Patient seen and chart reviewed. Last seen on 6/01/2022    Patient has a psychiatric history of: MDD, BRET    Pt presents for follow up today after restarting lexapro 10 mg and starting adderall XR 10 mg. Cardiology approved patient being on both.     Patient states that she has noticed "a huge difference" since starting Adderall. She states that she is more able to do her job, she is more able to accomplish errands and tasks; she states "I just get up and go" rather than contemplating and procrastinating. She reports less anxiety as she states "I'm not thinking about how much effort everything will take."  She has also been seeing her friends more and has a new boyfriend. She states she has never been able to balance things enough to date or see her friends regualrly.   The patient's mood is "good" as sheis feeling more confident. She states being able to get things done, such as cleanign her room, accomplishing tasks, and "not laying stagnant" has made her happier.     She states "I don't have any anxiety". She reports she hasn't had any anxiety attacks since starting lexapro.     She denies adverse effects from the medication.     She is eating and sleeping well      Psychotherapy:  Target symptoms: distractability, lack of focus, anxiety   Why chosen therapy is appropriate versus another modality: relevant to diagnosis, evidence based practice  Outcome monitoring methods: self-report  Therapeutic intervention type: supportive psychotherapy  Topics discussed/themes: building skills sets for symptom management, symptom recognition  The patient's response to the " "intervention is accepting. The patient's progress toward treatment goals is excellent.   Duration of intervention: 12 minutes.    Review of Systems   PSYCHIATRIC: Pertinant items are noted in the narrative.    Past Medication Trials:  "SSRIs"    Past Medical, Family and Social History: The patient's past medical, family and social history have been reviewed and updated as appropriate within the electronic medical record - see encounter notes.    Compliance: yes    Side effects: None    Risk Parameters:  Patient reports no suicidal ideation  Patient reports no homicidal ideation  Patient reports no self-injurious behavior  Patient reports no violent behavior    Exam (detailed: at least 9 elements; comprehensive: all 15 elements)   Constitutional  Vitals:  Most recent vital signs, dated less than 90 days prior to this appointment, were reviewed.   Vitals:    09/16/22 0907   BP: 118/78   Pulse: 94   Weight: 68.9 kg (152 lb 0.1 oz)          General:  unremarkable, age appropriate, casually dressed, neatly groomed     Musculoskeletal  Muscle Strength/Tone:  not examined   Gait & Station:  non-ataxic     Psychiatric  Speech:  no latency; no press   Mood & Affect:  steady, euthymic  congruent and appropriate   Thought Process:  normal and logical   Associations:  intact   Thought Content:  normal, no suicidality, no homicidality, delusions, or paranoia   Insight:  intact   Judgement: behavior is adequate to circumstances   Orientation:  grossly intact   Memory: intact for content of interview   Language: grossly intact   Attention Span & Concentration:  able to focus   Fund of Knowledge:  intact and appropriate to age and level of education     Assessment and Diagnosis   Status/Progress: Based on the examination today, the patient's problem(s) is/are inadequately controlled.  New problems have been presented today.   Lack of compliance are not complicating management of the primary condition.  There are no active rule-out " diagnoses for this patient at this time.     General Impression: Patient is a 27 year old female with a psychiatric history of anxiety and ADHD. Patient reports significant improvement in her symptoms since starting lexapro 10 mg and Adderall XR 10 mg. She had an abnormal EKG but has been cleared by cardio to continue medication.       ICD-10-CM ICD-9-CM    1. Generalized anxiety disorder  F41.1 300.02 EScitalopram oxalate (LEXAPRO) 10 MG tablet      2. ADHD (attention deficit hyperactivity disorder), combined type  F90.2 314.01 dextroamphetamine-amphetamine (ADDERALL XR) 10 MG 24 hr capsule      dextroamphetamine-amphetamine (ADDERALL XR) 10 MG 24 hr capsule      dextroamphetamine-amphetamine (ADDERALL XR) 10 MG 24 hr capsule            Intervention/Counseling/Treatment Plan   Medication Management: The risks and benefits of medication were discussed with the patient.   Continue lexapro 10 mg  Continue Adderall XR 10 mg   Discussed diagnosis, risk and benefits of proposed treatment above vs alternative treatment vs no treatment, and potential side effects of these treatments, and the inherent unpredictability of individual responses to these treatments. The patient expresses understanding and gives informed consent to pursue treatment at this time, believing that the potential benefits outweigh the potential risks. Patient has no other questions.   Patient has no contraindications: no h/o allergic rxn, agitation, anxiety, tourette's, arrythmia, cardiovascular disease, cardiac structural abnormalities, hyperthyroidism, glaucoma, Other psychiatric illness, etc.   Discussed diagnosis, risks and benefits of proposed treatment vs alternative treatments vs no treatment, and potential side effects of these treatments (death, dependency, psychosis, tino, aggression, HTN, ticks, MI, stroke, arrythmia, seizure, anaphylaxis or other allergic reactions, leukopenia, nervousness, anorexia, insomnia, tachycardia, palpitations,  dizziness, BP changes, HR changes, visual disturbance, etc.). The patient expresses understanding of the above and displays the capacity to agree with this treatment given said understanding. Patient also agrees that, currently, the benefits outweigh the risks and would like to pursue treatment at this time.  Patient voices understanding and agreement with this plan  Encouraged patient to keep future appointments  Instruct patient to call or message with questions  In the event of an emergency, including suicidal ideation, patient was advised to go to the emergency room      Return to Clinic: 3 months    Sangita Shepard PA-C

## 2022-12-14 ENCOUNTER — OFFICE VISIT (OUTPATIENT)
Dept: PSYCHIATRY | Facility: CLINIC | Age: 27
End: 2022-12-14
Payer: COMMERCIAL

## 2022-12-14 VITALS
BODY MASS INDEX: 22.04 KG/M2 | HEART RATE: 86 BPM | WEIGHT: 149.25 LBS | SYSTOLIC BLOOD PRESSURE: 132 MMHG | DIASTOLIC BLOOD PRESSURE: 85 MMHG

## 2022-12-14 DIAGNOSIS — F90.2 ADHD (ATTENTION DEFICIT HYPERACTIVITY DISORDER), COMBINED TYPE: Primary | ICD-10-CM

## 2022-12-14 DIAGNOSIS — F41.1 GENERALIZED ANXIETY DISORDER: ICD-10-CM

## 2022-12-14 PROCEDURE — 3075F SYST BP GE 130 - 139MM HG: CPT | Mod: CPTII,S$GLB,, | Performed by: PHYSICIAN ASSISTANT

## 2022-12-14 PROCEDURE — 99999 PR PBB SHADOW E&M-EST. PATIENT-LVL III: CPT | Mod: PBBFAC,,, | Performed by: PHYSICIAN ASSISTANT

## 2022-12-14 PROCEDURE — 99214 OFFICE O/P EST MOD 30 MIN: CPT | Mod: S$GLB,,, | Performed by: PHYSICIAN ASSISTANT

## 2022-12-14 PROCEDURE — 3008F PR BODY MASS INDEX (BMI) DOCUMENTED: ICD-10-PCS | Mod: CPTII,S$GLB,, | Performed by: PHYSICIAN ASSISTANT

## 2022-12-14 PROCEDURE — 3079F PR MOST RECENT DIASTOLIC BLOOD PRESSURE 80-89 MM HG: ICD-10-PCS | Mod: CPTII,S$GLB,, | Performed by: PHYSICIAN ASSISTANT

## 2022-12-14 PROCEDURE — 3075F PR MOST RECENT SYSTOLIC BLOOD PRESS GE 130-139MM HG: ICD-10-PCS | Mod: CPTII,S$GLB,, | Performed by: PHYSICIAN ASSISTANT

## 2022-12-14 PROCEDURE — 99214 PR OFFICE/OUTPT VISIT, EST, LEVL IV, 30-39 MIN: ICD-10-PCS | Mod: S$GLB,,, | Performed by: PHYSICIAN ASSISTANT

## 2022-12-14 PROCEDURE — 1160F RVW MEDS BY RX/DR IN RCRD: CPT | Mod: CPTII,S$GLB,, | Performed by: PHYSICIAN ASSISTANT

## 2022-12-14 PROCEDURE — 3008F BODY MASS INDEX DOCD: CPT | Mod: CPTII,S$GLB,, | Performed by: PHYSICIAN ASSISTANT

## 2022-12-14 PROCEDURE — 1160F PR REVIEW ALL MEDS BY PRESCRIBER/CLIN PHARMACIST DOCUMENTED: ICD-10-PCS | Mod: CPTII,S$GLB,, | Performed by: PHYSICIAN ASSISTANT

## 2022-12-14 PROCEDURE — 99999 PR PBB SHADOW E&M-EST. PATIENT-LVL III: ICD-10-PCS | Mod: PBBFAC,,, | Performed by: PHYSICIAN ASSISTANT

## 2022-12-14 PROCEDURE — 1159F PR MEDICATION LIST DOCUMENTED IN MEDICAL RECORD: ICD-10-PCS | Mod: CPTII,S$GLB,, | Performed by: PHYSICIAN ASSISTANT

## 2022-12-14 PROCEDURE — 1159F MED LIST DOCD IN RCRD: CPT | Mod: CPTII,S$GLB,, | Performed by: PHYSICIAN ASSISTANT

## 2022-12-14 PROCEDURE — 3079F DIAST BP 80-89 MM HG: CPT | Mod: CPTII,S$GLB,, | Performed by: PHYSICIAN ASSISTANT

## 2022-12-14 RX ORDER — ESCITALOPRAM OXALATE 10 MG/1
10 TABLET ORAL DAILY
Qty: 90 TABLET | Refills: 0 | Status: SHIPPED | OUTPATIENT
Start: 2022-12-14 | End: 2023-08-21 | Stop reason: SDUPTHER

## 2022-12-14 RX ORDER — DEXTROAMPHETAMINE SACCHARATE, AMPHETAMINE ASPARTATE MONOHYDRATE, DEXTROAMPHETAMINE SULFATE AND AMPHETAMINE SULFATE 5; 5; 5; 5 MG/1; MG/1; MG/1; MG/1
20 CAPSULE, EXTENDED RELEASE ORAL EVERY MORNING
Qty: 30 CAPSULE | Refills: 0 | Status: SHIPPED | OUTPATIENT
Start: 2022-12-14 | End: 2023-01-25 | Stop reason: DRUGHIGH

## 2022-12-14 NOTE — PROGRESS NOTES
"Outpatient Psychiatry Follow-Up Visit (PA)    12/14/2022    Clinical Status of Patient:  Outpatient (Ambulatory)    Chief Complaint:  Milka Smith is a 27 y.o. female who presents today for follow-up of anxiety.  Met with patient.     Current Medications:   lexapro 10 mg  Adderall XR 10 mg    Interval History and Content of Current Session:  Patient seen and chart reviewed. Last seen on 9/16/2022    Patient has a psychiatric history of: MDD, BRET, ADHD    Patient presents for follow up today stating she is "okay" and is "doing pretty good."    She reports she has not had any anxiety issues.   Patient reports feeling "stagnant." She reports feeling the first month everything was good, but feels the last 2 months have not been as good as the first month in terms of ADHD management. She reports feeling the Adderall is not working as well as it was, "I feel myself lose my concentration, catch myself moving too fast."     She feels the lexapro is working well. Her anxiety is better controlled. She reports anxiety is more situational than generalized. She reports no anxiety attacks since starting lexapro. She "feels very good about that."    She reports her mood has been "pretty good." She denies any depression, denies thoughts of feeling like a failure. She is no longer making self deprecating comments.  She is feeling more confident in herself.     She reports decreased sex drive/libido but is not wanting to make med changes at this time.     She denies SI/HI/AVH    Psychotherapy:  Target symptoms: distractability, lack of focus, anxiety   Why chosen therapy is appropriate versus another modality: relevant to diagnosis, evidence based practice  Outcome monitoring methods: self-report  Therapeutic intervention type: supportive psychotherapy  Topics discussed/themes: building skills sets for symptom management, symptom recognition  The patient's response to the intervention is accepting. The patient's progress " "toward treatment goals is good.   Duration of intervention: 15 minutes.    Review of Systems   PSYCHIATRIC: Pertinant items are noted in the narrative.    Past Medication Trials:  "SSRIs"    Past Medical, Family and Social History: The patient's past medical, family and social history have been reviewed and updated as appropriate within the electronic medical record - see encounter notes.  Patient cleared by cardiology to take ADHD stimulant, lexapro    Compliance: yes    Side effects: None    Risk Parameters:  Patient reports no suicidal ideation  Patient reports no homicidal ideation  Patient reports no self-injurious behavior  Patient reports no violent behavior    Exam (detailed: at least 9 elements; comprehensive: all 15 elements)   Constitutional  Vitals:  Most recent vital signs, dated less than 90 days prior to this appointment, were reviewed.   Vitals:    12/14/22 0940   BP: 132/85   Pulse: 86   Weight: 67.7 kg (149 lb 4 oz)            General:  unremarkable, age appropriate, casually dressed, neatly groomed     Musculoskeletal  Muscle Strength/Tone:  not examined   Gait & Station:  non-ataxic     Psychiatric  Speech:  no latency; no press   Mood & Affect:  steady, euthymic  congruent and appropriate   Thought Process:  normal and logical   Associations:  intact   Thought Content:  normal, no suicidality, no homicidality, delusions, or paranoia   Insight:  intact   Judgement: behavior is adequate to circumstances   Orientation:  grossly intact   Memory: intact for content of interview   Language: grossly intact   Attention Span & Concentration:  able to focus   Fund of Knowledge:  intact and appropriate to age and level of education     Assessment and Diagnosis   Status/Progress: Based on the examination today, the patient's problem(s) is/are adequately but not ideally controlled.  New problems have not been presented today.   Lack of compliance are not complicating management of the primary condition.  " There are no active rule-out diagnoses for this patient at this time.     General Impression: Patient is a 27 year old female with a psychiatric history of anxiety and ADHD. Patient reports significant improvement in her symptoms since starting lexapro 10 mg and Adderall XR 10 mg. Patient reports decreased efficacy of Adderall and is amenable to increasing the dose    She had an abnormal EKG but has been cleared by cardio to continue medication.       ICD-10-CM ICD-9-CM    1. ADHD (attention deficit hyperactivity disorder), combined type  F90.2 314.01 dextroamphetamine-amphetamine (ADDERALL XR) 20 MG 24 hr capsule      2. Generalized anxiety disorder  F41.1 300.02 EScitalopram oxalate (LEXAPRO) 10 MG tablet              Intervention/Counseling/Treatment Plan   Medication Management: The risks and benefits of medication were discussed with the patient.   Continue lexapro 10 mg  Increase to Adderall XR 20 mg   Discussed diagnosis, risk and benefits of proposed treatment above vs alternative treatment vs no treatment, and potential side effects of these treatments, and the inherent unpredictability of individual responses to these treatments. The patient expresses understanding and gives informed consent to pursue treatment at this time, believing that the potential benefits outweigh the potential risks. Patient has no other questions.   Patient has no contraindications: no h/o allergic rxn, agitation, anxiety, tourette's, arrythmia, cardiovascular disease, cardiac structural abnormalities, hyperthyroidism, glaucoma, Other psychiatric illness, etc.   Discussed diagnosis, risks and benefits of proposed treatment vs alternative treatments vs no treatment, and potential side effects of these treatments (death, dependency, psychosis, tino, aggression, HTN, ticks, MI, stroke, arrythmia, seizure, anaphylaxis or other allergic reactions, leukopenia, nervousness, anorexia, insomnia, tachycardia, palpitations, dizziness, BP  changes, HR changes, visual disturbance, etc.). The patient expresses understanding of the above and displays the capacity to agree with this treatment given said understanding. Patient also agrees that, currently, the benefits outweigh the risks and would like to pursue treatment at this time.  Patient voices understanding and agreement with this plan  Encouraged patient to keep future appointments  Instruct patient to call or message with questions  In the event of an emergency, including suicidal ideation, patient was advised to go to the emergency room      Return to Clinic: 3 months    Sangita Shepard PA-C

## 2023-01-24 ENCOUNTER — PATIENT MESSAGE (OUTPATIENT)
Dept: PSYCHIATRY | Facility: CLINIC | Age: 28
End: 2023-01-24
Payer: COMMERCIAL

## 2023-01-25 DIAGNOSIS — F90.2 ADHD (ATTENTION DEFICIT HYPERACTIVITY DISORDER), COMBINED TYPE: ICD-10-CM

## 2023-01-25 RX ORDER — DEXTROAMPHETAMINE SACCHARATE, AMPHETAMINE ASPARTATE MONOHYDRATE, DEXTROAMPHETAMINE SULFATE AND AMPHETAMINE SULFATE 5; 5; 5; 5 MG/1; MG/1; MG/1; MG/1
20 CAPSULE, EXTENDED RELEASE ORAL EVERY MORNING
Qty: 30 CAPSULE | Refills: 0 | Status: SHIPPED | OUTPATIENT
Start: 2023-01-25 | End: 2023-03-17 | Stop reason: ALTCHOICE

## 2023-01-25 RX ORDER — DEXTROAMPHETAMINE SACCHARATE, AMPHETAMINE ASPARTATE MONOHYDRATE, DEXTROAMPHETAMINE SULFATE AND AMPHETAMINE SULFATE 5; 5; 5; 5 MG/1; MG/1; MG/1; MG/1
20 CAPSULE, EXTENDED RELEASE ORAL EVERY MORNING
Qty: 30 CAPSULE | Refills: 0 | Status: SHIPPED | OUTPATIENT
Start: 2023-02-25 | End: 2023-03-17 | Stop reason: ALTCHOICE

## 2023-02-08 ENCOUNTER — PATIENT MESSAGE (OUTPATIENT)
Dept: PSYCHIATRY | Facility: CLINIC | Age: 28
End: 2023-02-08
Payer: COMMERCIAL

## 2023-02-14 DIAGNOSIS — F90.2 ADHD (ATTENTION DEFICIT HYPERACTIVITY DISORDER), COMBINED TYPE: Primary | ICD-10-CM

## 2023-02-14 RX ORDER — LISDEXAMFETAMINE DIMESYLATE CAPSULES 20 MG/1
20 CAPSULE ORAL EVERY MORNING
Qty: 30 CAPSULE | Refills: 0 | Status: SHIPPED | OUTPATIENT
Start: 2023-02-14 | End: 2023-03-17 | Stop reason: SDUPTHER

## 2023-03-17 ENCOUNTER — OFFICE VISIT (OUTPATIENT)
Dept: PSYCHIATRY | Facility: CLINIC | Age: 28
End: 2023-03-17
Payer: COMMERCIAL

## 2023-03-17 DIAGNOSIS — F90.2 ADHD (ATTENTION DEFICIT HYPERACTIVITY DISORDER), COMBINED TYPE: ICD-10-CM

## 2023-03-17 DIAGNOSIS — F41.1 GENERALIZED ANXIETY DISORDER: ICD-10-CM

## 2023-03-17 PROCEDURE — 1160F RVW MEDS BY RX/DR IN RCRD: CPT | Mod: CPTII,95,, | Performed by: PHYSICIAN ASSISTANT

## 2023-03-17 PROCEDURE — 1159F MED LIST DOCD IN RCRD: CPT | Mod: CPTII,95,, | Performed by: PHYSICIAN ASSISTANT

## 2023-03-17 PROCEDURE — 99214 PR OFFICE/OUTPT VISIT, EST, LEVL IV, 30-39 MIN: ICD-10-PCS | Mod: 95,,, | Performed by: PHYSICIAN ASSISTANT

## 2023-03-17 PROCEDURE — 99214 OFFICE O/P EST MOD 30 MIN: CPT | Mod: 95,,, | Performed by: PHYSICIAN ASSISTANT

## 2023-03-17 PROCEDURE — 1159F PR MEDICATION LIST DOCUMENTED IN MEDICAL RECORD: ICD-10-PCS | Mod: CPTII,95,, | Performed by: PHYSICIAN ASSISTANT

## 2023-03-17 PROCEDURE — 1160F PR REVIEW ALL MEDS BY PRESCRIBER/CLIN PHARMACIST DOCUMENTED: ICD-10-PCS | Mod: CPTII,95,, | Performed by: PHYSICIAN ASSISTANT

## 2023-03-17 RX ORDER — LISDEXAMFETAMINE DIMESYLATE CAPSULES 20 MG/1
20 CAPSULE ORAL EVERY MORNING
Qty: 30 CAPSULE | Refills: 0 | Status: SHIPPED | OUTPATIENT
Start: 2023-03-17 | End: 2023-08-12

## 2023-03-17 RX ORDER — LISDEXAMFETAMINE DIMESYLATE CAPSULES 20 MG/1
20 CAPSULE ORAL EVERY MORNING
Qty: 30 CAPSULE | Refills: 0 | Status: SHIPPED | OUTPATIENT
Start: 2023-04-17 | End: 2023-08-12

## 2023-03-17 RX ORDER — LISDEXAMFETAMINE DIMESYLATE CAPSULES 20 MG/1
20 CAPSULE ORAL EVERY MORNING
Qty: 30 CAPSULE | Refills: 0 | Status: SHIPPED | OUTPATIENT
Start: 2023-05-17 | End: 2023-08-12

## 2023-03-17 NOTE — PROGRESS NOTES
"The patient location is: [facility]  The chief complaint leading to consultation is: f/u    Visit type: audiovisual    Face to Face time with patient: 8  12 minutes of total time spent on the encounter, which includes face to face time and non-face to face time preparing to see the patient (eg, review of tests), Obtaining and/or reviewing separately obtained history, Documenting clinical information in the electronic or other health record, Independently interpreting results (not separately reported) and communicating results to the patient/family/caregiver, or Care coordination (not separately reported).         Each patient to whom he or she provides medical services by telemedicine is:  (1) informed of the relationship between the physician and patient and the respective role of any other health care provider with respect to management of the patient; and (2) notified that he or she may decline to receive medical services by telemedicine and may withdraw from such care at any time.    Notes:     Outpatient Psychiatry Follow-Up Visit (PA)    03/17/2023    Clinical Status of Patient:  Outpatient (Ambulatory)    Chief Complaint:  Milka Smith is a 27 y.o. female who presents today for follow-up of anxiety.  Met with patient.     Current Medications:   lexapro 10 mg  Vyvanse 20 mg    Interval History and Content of Current Session:  Patient seen and chart reviewed. Last seen on 12/14/2022    Patient has a psychiatric history of: MDD, BRET, ADHD    Patient presents for follow up today after increasing to Adderall XR 20 mg, then switching to Vyvanse 20 mg due to Adderall shortage.     Patient states she is doing well.   Patient states she feels she is doing "really well."  She reports the Vyvanse is working "about the same".  She reports the medication helps to slow her thoughts, think more clearly.     Her mood has been "fine, no changes".  She reports improvement in depression, better control of anger.   She " "reports anxiety has been better controlled- she is no longer having panic attacks, is no longer having difficulty running errands.     She reports the lexapro is working well, denies adverse effects.   She denies adverse effects of Vyvanse, denies chest pain, tachycardia, palpitations.     She is eating and sleeping well.     Patient states "I like where I'm at right now, I like it a lot."    Psychotherapy:  Target symptoms: distractability, lack of focus, anxiety   Why chosen therapy is appropriate versus another modality: relevant to diagnosis, evidence based practice  Outcome monitoring methods: self-report  Therapeutic intervention type: supportive psychotherapy  Topics discussed/themes: building skills sets for symptom management, symptom recognition  The patient's response to the intervention is accepting. The patient's progress toward treatment goals is good.   Duration of intervention: 5 minutes.    Review of Systems   PSYCHIATRIC: Pertinant items are noted in the narrative.    Past Medication Trials:  "SSRIs"  Adderall- worked well, shortage    Past Medical, Family and Social History: The patient's past medical, family and social history have been reviewed and updated as appropriate within the electronic medical record - see encounter notes.  Patient cleared by cardiology to take ADHD stimulant, lexapro    Compliance: yes    Side effects: None    Risk Parameters:  Patient reports no suicidal ideation  Patient reports no homicidal ideation  Patient reports no self-injurious behavior  Patient reports no violent behavior    Exam (detailed: at least 9 elements; comprehensive: all 15 elements)   Constitutional  Vitals:  Most recent vital signs, dated less than 90 days prior to this appointment, were reviewed.   There were no vitals filed for this visit.           General:  unremarkable, age appropriate, casually dressed, neatly groomed     Musculoskeletal  Muscle Strength/Tone:  not examined   Gait & Station:  " non-ataxic     Psychiatric  Speech:  no latency; no press   Mood & Affect:  steady, euthymic  congruent and appropriate   Thought Process:  normal and logical   Associations:  intact   Thought Content:  normal, no suicidality, no homicidality, delusions, or paranoia   Insight:  intact   Judgement: behavior is adequate to circumstances   Orientation:  grossly intact   Memory: intact for content of interview   Language: grossly intact   Attention Span & Concentration:  able to focus   Fund of Knowledge:  intact and appropriate to age and level of education     Assessment and Diagnosis   Status/Progress: Based on the examination today, the patient's problem(s) is/are adequately but not ideally controlled.  New problems have not been presented today.   Lack of compliance are not complicating management of the primary condition.  There are no active rule-out diagnoses for this patient at this time.     General Impression: Patient is a 27 year old female with a psychiatric history of anxiety and ADHD. Patient reports significant improvement in her symptoms since starting lexapro 10 mg and Vyvanse 20 mg.     Patient is stable, doing well. No changes to be made.      ICD-10-CM ICD-9-CM    1. ADHD (attention deficit hyperactivity disorder), combined type  F90.2 314.01 lisdexamfetamine (VYVANSE) 20 MG capsule      lisdexamfetamine (VYVANSE) 20 MG capsule      lisdexamfetamine (VYVANSE) 20 MG capsule      2. Generalized anxiety disorder  F41.1 300.02                 Intervention/Counseling/Treatment Plan   Medication Management: The risks and benefits of medication were discussed with the patient.   Continue lexapro 10 mg  Continue Vyvanse 20 mg  Discussed diagnosis, risk and benefits of proposed treatment above vs alternative treatment vs no treatment, and potential side effects of these treatments, and the inherent unpredictability of individual responses to these treatments. The patient expresses understanding and gives  informed consent to pursue treatment at this time, believing that the potential benefits outweigh the potential risks. Patient has no other questions.   Patient has no contraindications: no h/o allergic rxn, agitation, anxiety, tourette's, arrythmia, cardiovascular disease, cardiac structural abnormalities, hyperthyroidism, glaucoma, Other psychiatric illness, etc.   Discussed diagnosis, risks and benefits of proposed treatment vs alternative treatments vs no treatment, and potential side effects of these treatments (death, dependency, psychosis, tino, aggression, HTN, ticks, MI, stroke, arrythmia, seizure, anaphylaxis or other allergic reactions, leukopenia, nervousness, anorexia, insomnia, tachycardia, palpitations, dizziness, BP changes, HR changes, visual disturbance, etc.). The patient expresses understanding of the above and displays the capacity to agree with this treatment given said understanding. Patient also agrees that, currently, the benefits outweigh the risks and would like to pursue treatment at this time.  Patient voices understanding and agreement with this plan  Encouraged patient to keep future appointments  Instruct patient to call or message with questions  In the event of an emergency, including suicidal ideation, patient was advised to go to the emergency room      Return to Clinic: 3 months    Sangita Shepard PA-C

## 2023-05-29 ENCOUNTER — OFFICE VISIT (OUTPATIENT)
Dept: PSYCHIATRY | Facility: CLINIC | Age: 28
End: 2023-05-29
Payer: COMMERCIAL

## 2023-05-29 VITALS
HEART RATE: 90 BPM | DIASTOLIC BLOOD PRESSURE: 72 MMHG | BODY MASS INDEX: 21.18 KG/M2 | SYSTOLIC BLOOD PRESSURE: 117 MMHG | WEIGHT: 143.44 LBS

## 2023-05-29 DIAGNOSIS — F90.2 ADHD (ATTENTION DEFICIT HYPERACTIVITY DISORDER), COMBINED TYPE: Primary | ICD-10-CM

## 2023-05-29 DIAGNOSIS — F41.1 GENERALIZED ANXIETY DISORDER: ICD-10-CM

## 2023-05-29 PROCEDURE — 3008F BODY MASS INDEX DOCD: CPT | Mod: CPTII,S$GLB,, | Performed by: PHYSICIAN ASSISTANT

## 2023-05-29 PROCEDURE — 99999 PR PBB SHADOW E&M-EST. PATIENT-LVL III: ICD-10-PCS | Mod: PBBFAC,,, | Performed by: PHYSICIAN ASSISTANT

## 2023-05-29 PROCEDURE — 1159F MED LIST DOCD IN RCRD: CPT | Mod: CPTII,S$GLB,, | Performed by: PHYSICIAN ASSISTANT

## 2023-05-29 PROCEDURE — 1160F PR REVIEW ALL MEDS BY PRESCRIBER/CLIN PHARMACIST DOCUMENTED: ICD-10-PCS | Mod: CPTII,S$GLB,, | Performed by: PHYSICIAN ASSISTANT

## 2023-05-29 PROCEDURE — 1160F RVW MEDS BY RX/DR IN RCRD: CPT | Mod: CPTII,S$GLB,, | Performed by: PHYSICIAN ASSISTANT

## 2023-05-29 PROCEDURE — 99214 OFFICE O/P EST MOD 30 MIN: CPT | Mod: S$GLB,,, | Performed by: PHYSICIAN ASSISTANT

## 2023-05-29 PROCEDURE — 1159F PR MEDICATION LIST DOCUMENTED IN MEDICAL RECORD: ICD-10-PCS | Mod: CPTII,S$GLB,, | Performed by: PHYSICIAN ASSISTANT

## 2023-05-29 PROCEDURE — 3074F SYST BP LT 130 MM HG: CPT | Mod: CPTII,S$GLB,, | Performed by: PHYSICIAN ASSISTANT

## 2023-05-29 PROCEDURE — 3008F PR BODY MASS INDEX (BMI) DOCUMENTED: ICD-10-PCS | Mod: CPTII,S$GLB,, | Performed by: PHYSICIAN ASSISTANT

## 2023-05-29 PROCEDURE — 3074F PR MOST RECENT SYSTOLIC BLOOD PRESSURE < 130 MM HG: ICD-10-PCS | Mod: CPTII,S$GLB,, | Performed by: PHYSICIAN ASSISTANT

## 2023-05-29 PROCEDURE — 99999 PR PBB SHADOW E&M-EST. PATIENT-LVL III: CPT | Mod: PBBFAC,,, | Performed by: PHYSICIAN ASSISTANT

## 2023-05-29 PROCEDURE — 3078F DIAST BP <80 MM HG: CPT | Mod: CPTII,S$GLB,, | Performed by: PHYSICIAN ASSISTANT

## 2023-05-29 PROCEDURE — 99214 PR OFFICE/OUTPT VISIT, EST, LEVL IV, 30-39 MIN: ICD-10-PCS | Mod: S$GLB,,, | Performed by: PHYSICIAN ASSISTANT

## 2023-05-29 PROCEDURE — 3078F PR MOST RECENT DIASTOLIC BLOOD PRESSURE < 80 MM HG: ICD-10-PCS | Mod: CPTII,S$GLB,, | Performed by: PHYSICIAN ASSISTANT

## 2023-05-29 RX ORDER — DEXTROAMPHETAMINE SACCHARATE, AMPHETAMINE ASPARTATE MONOHYDRATE, DEXTROAMPHETAMINE SULFATE AND AMPHETAMINE SULFATE 2.5; 2.5; 2.5; 2.5 MG/1; MG/1; MG/1; MG/1
10 CAPSULE, EXTENDED RELEASE ORAL EVERY MORNING
Qty: 30 CAPSULE | Refills: 0 | Status: SHIPPED | OUTPATIENT
Start: 2023-06-29 | End: 2023-07-07 | Stop reason: DRUGHIGH

## 2023-05-29 RX ORDER — DEXTROAMPHETAMINE SACCHARATE, AMPHETAMINE ASPARTATE MONOHYDRATE, DEXTROAMPHETAMINE SULFATE AND AMPHETAMINE SULFATE 2.5; 2.5; 2.5; 2.5 MG/1; MG/1; MG/1; MG/1
10 CAPSULE, EXTENDED RELEASE ORAL EVERY MORNING
Qty: 30 CAPSULE | Refills: 0 | Status: SHIPPED | OUTPATIENT
Start: 2023-05-29 | End: 2023-07-07 | Stop reason: DRUGHIGH

## 2023-05-29 NOTE — PROGRESS NOTES
"Outpatient Psychiatry Follow-Up Visit (PA)    05/29/2023    Clinical Status of Patient:  Outpatient (Ambulatory)    Chief Complaint:  Milka Smith is a 28 y.o. female who presents today for follow-up of anxiety.  Met with patient.     Current Medications:   lexapro 10 mg  Vyvanse 20 mg    Interval History and Content of Current Session:  Patient seen and chart reviewed. Last seen on 3/17/2023    Patient has a psychiatric history of: MDD, BRET, ADHD    Patient presents for follow up today stating she is doing well.     She is wanting to switch back to Adderall, states she liked the Adderall better.   She states the vyvanse works but felt the Adderall worked better.     She reports lexapro is working well.   She denies depression at this time.   Her anxiety is "a lot better."  She reports feeling more confident.     Work is going well.     She denies ASE from medications.   BP and HR well controlled, she denies any tachycardia or palpitations.     She is sleeping well and eating well.     Patient states "I like where my life is at right now."      Psychotherapy:  Target symptoms: distractability, lack of focus, anxiety   Why chosen therapy is appropriate versus another modality: relevant to diagnosis, evidence based practice  Outcome monitoring methods: self-report  Therapeutic intervention type: supportive psychotherapy  Topics discussed/themes: building skills sets for symptom management, symptom recognition  The patient's response to the intervention is accepting. The patient's progress toward treatment goals is good.   Duration of intervention: 8 minutes.    Review of Systems   PSYCHIATRIC: Pertinant items are noted in the narrative.    Past Medication Trials:  "SSRIs"  Adderall- worked well, shortage    Past Medical, Family and Social History: The patient's past medical, family and social history have been reviewed and updated as appropriate within the electronic medical record - see encounter " notes.  Patient cleared by cardiology to take ADHD stimulant, lexapro    Compliance: yes    Side effects: None    Risk Parameters:  Patient reports no suicidal ideation  Patient reports no homicidal ideation  Patient reports no self-injurious behavior  Patient reports no violent behavior    Exam (detailed: at least 9 elements; comprehensive: all 15 elements)   Constitutional  Vitals:  Most recent vital signs, dated less than 90 days prior to this appointment, were reviewed.   Vitals:    05/29/23 1058   BP: 117/72   Pulse: 90   Weight: 65.1 kg (143 lb 6.6 oz)              General:  unremarkable, age appropriate, casually dressed, neatly groomed     Musculoskeletal  Muscle Strength/Tone:  not examined   Gait & Station:  non-ataxic     Psychiatric  Speech:  no latency; no press   Mood & Affect:  steady, euthymic  congruent and appropriate   Thought Process:  normal and logical   Associations:  intact   Thought Content:  normal, no suicidality, no homicidality, delusions, or paranoia   Insight:  intact   Judgement: behavior is adequate to circumstances   Orientation:  grossly intact   Memory: intact for content of interview   Language: grossly intact   Attention Span & Concentration:  able to focus   Fund of Knowledge:  intact and appropriate to age and level of education     Assessment and Diagnosis   Status/Progress: Based on the examination today, the patient's problem(s) is/are adequately but not ideally controlled.  New problems have not been presented today.   Lack of compliance are not complicating management of the primary condition.  There are no active rule-out diagnoses for this patient at this time.     General Impression: Patient is a 28 year old female with a psychiatric history of anxiety and ADHD. Patient reports significant improvement in her symptoms since starting lexapro 10 mg and Vyvanse 20 mg. She would like to switch back to Adderall XR 10 mg, states she liked it better, only switched due to  shortage.     Patient is stable, doing well.       ICD-10-CM ICD-9-CM    1. ADHD (attention deficit hyperactivity disorder), combined type  F90.2 314.01 dextroamphetamine-amphetamine (ADDERALL XR) 10 MG 24 hr capsule      dextroamphetamine-amphetamine (ADDERALL XR) 10 MG 24 hr capsule      2. Generalized anxiety disorder  F41.1 300.02                   Intervention/Counseling/Treatment Plan   Medication Management: The risks and benefits of medication were discussed with the patient.   Continue lexapro 10 mg  Stop Vyvanse 20 mg  Start Adderall XR 10 mg  Discussed diagnosis, risk and benefits of proposed treatment above vs alternative treatment vs no treatment, and potential side effects of these treatments, and the inherent unpredictability of individual responses to these treatments. The patient expresses understanding and gives informed consent to pursue treatment at this time, believing that the potential benefits outweigh the potential risks. Patient has no other questions.   Patient has no contraindications: no h/o allergic rxn, agitation, anxiety, tourette's, arrythmia, cardiovascular disease, cardiac structural abnormalities, hyperthyroidism, glaucoma, Other psychiatric illness, etc.   Discussed diagnosis, risks and benefits of proposed treatment vs alternative treatments vs no treatment, and potential side effects of these treatments (death, dependency, psychosis, tino, aggression, HTN, ticks, MI, stroke, arrythmia, seizure, anaphylaxis or other allergic reactions, leukopenia, nervousness, anorexia, insomnia, tachycardia, palpitations, dizziness, BP changes, HR changes, visual disturbance, etc.). The patient expresses understanding of the above and displays the capacity to agree with this treatment given said understanding. Patient also agrees that, currently, the benefits outweigh the risks and would like to pursue treatment at this time.  Patient voices understanding and agreement with this  plan  Encouraged patient to keep future appointments  Instruct patient to call or message with questions  In the event of an emergency, including suicidal ideation, patient was advised to go to the emergency room      Return to Clinic: 3 months    Sangita Shepard PA-C

## 2023-07-07 ENCOUNTER — PATIENT MESSAGE (OUTPATIENT)
Dept: PSYCHIATRY | Facility: CLINIC | Age: 28
End: 2023-07-07
Payer: COMMERCIAL

## 2023-07-07 DIAGNOSIS — F90.2 ADHD (ATTENTION DEFICIT HYPERACTIVITY DISORDER), COMBINED TYPE: Primary | ICD-10-CM

## 2023-07-07 RX ORDER — DEXTROAMPHETAMINE SACCHARATE, AMPHETAMINE ASPARTATE MONOHYDRATE, DEXTROAMPHETAMINE SULFATE AND AMPHETAMINE SULFATE 5; 5; 5; 5 MG/1; MG/1; MG/1; MG/1
20 CAPSULE, EXTENDED RELEASE ORAL EVERY MORNING
Qty: 30 CAPSULE | Refills: 0 | Status: SHIPPED | OUTPATIENT
Start: 2023-07-07 | End: 2023-08-06 | Stop reason: SDUPTHER

## 2023-08-06 DIAGNOSIS — F90.2 ADHD (ATTENTION DEFICIT HYPERACTIVITY DISORDER), COMBINED TYPE: ICD-10-CM

## 2023-08-07 RX ORDER — DEXTROAMPHETAMINE SACCHARATE, AMPHETAMINE ASPARTATE MONOHYDRATE, DEXTROAMPHETAMINE SULFATE AND AMPHETAMINE SULFATE 5; 5; 5; 5 MG/1; MG/1; MG/1; MG/1
20 CAPSULE, EXTENDED RELEASE ORAL EVERY MORNING
Qty: 30 CAPSULE | Refills: 0 | Status: SHIPPED | OUTPATIENT
Start: 2023-08-07 | End: 2023-08-21 | Stop reason: SDUPTHER

## 2023-08-12 ENCOUNTER — OFFICE VISIT (OUTPATIENT)
Dept: URGENT CARE | Facility: CLINIC | Age: 28
End: 2023-08-12
Payer: OTHER MISCELLANEOUS

## 2023-08-12 VITALS
RESPIRATION RATE: 18 BRPM | WEIGHT: 143 LBS | HEART RATE: 104 BPM | TEMPERATURE: 99 F | OXYGEN SATURATION: 99 % | HEIGHT: 69 IN | BODY MASS INDEX: 21.18 KG/M2 | DIASTOLIC BLOOD PRESSURE: 72 MMHG | SYSTOLIC BLOOD PRESSURE: 106 MMHG
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
## 2023-08-12 DIAGNOSIS — Z02.6 ENCOUNTER RELATED TO WORKER'S COMPENSATION CLAIM: ICD-10-CM

## 2023-08-12 DIAGNOSIS — S61.259A CAT BITE OF FINGER, INITIAL ENCOUNTER: Primary | ICD-10-CM

## 2023-08-12 PROCEDURE — 90471 TD VACCINE GREATER THAN OR EQUAL TO 7YO WITH PRESERVATIVE IM: ICD-10-PCS | Mod: S$GLB,,, | Performed by: FAMILY MEDICINE

## 2023-08-12 PROCEDURE — 90714 TD VACCINE GREATER THAN OR EQUAL TO 7YO WITH PRESERVATIVE IM: ICD-10-PCS | Mod: S$GLB,,, | Performed by: FAMILY MEDICINE

## 2023-08-12 PROCEDURE — 99214 PR OFFICE/OUTPT VISIT, EST, LEVL IV, 30-39 MIN: ICD-10-PCS | Mod: 25,S$GLB,, | Performed by: FAMILY MEDICINE

## 2023-08-12 PROCEDURE — 99214 OFFICE O/P EST MOD 30 MIN: CPT | Mod: 25,S$GLB,, | Performed by: FAMILY MEDICINE

## 2023-08-12 PROCEDURE — 90471 IMMUNIZATION ADMIN: CPT | Mod: S$GLB,,, | Performed by: FAMILY MEDICINE

## 2023-08-12 PROCEDURE — 90714 TD VACC NO PRESV 7 YRS+ IM: CPT | Mod: S$GLB,,, | Performed by: FAMILY MEDICINE

## 2023-08-12 RX ORDER — AMOXICILLIN AND CLAVULANATE POTASSIUM 875; 125 MG/1; MG/1
1 TABLET, FILM COATED ORAL 2 TIMES DAILY
Qty: 20 TABLET | Refills: 0 | Status: SHIPPED | OUTPATIENT
Start: 2023-08-12 | End: 2023-08-22

## 2023-08-12 NOTE — PROGRESS NOTES
Subjective:      Patient ID: Milka Smith is a 28 y.o. female.    Chief Complaint: Animal Bite    Pt states she was  at work holding a cat for an employer/ colleague to draw blood on the cat the cat became irate and bit her finger and latched on and she went to clean it with chlorhexidine and alcohol wipes and bandaged it. Pt states he has had pain and swelling since and can not use the hand at all. Pt states that pain has eased with otc ibuprofen but it does return. Pt came to be evaluated and treated for symptoms. Pt states she is not UTD on tetanus shot as well.     Animal Bite   The incident occurred yesterday. The incident occurred at work. She came to the ER via personal transport. There is an injury to the Left index finger. The pain is mild (intesifies with touching). Associated symptoms include inability to bear weight. There have been no prior injuries to these areas. She is Right-handed. Her tetanus status is out of date. There were no sick contacts. She has received no recent medical care.     ROS no fever or chills  Objective:     Physical Exam  Vitals and nursing note reviewed.   Constitutional:       Appearance: Normal appearance.   Musculoskeletal:        Hands:       Comments: Pain and swelling and ttp of affected finger and palm. Rom reduced due to pain and swelling in finger but otherwise no deformity or instability   Skin:     General: Skin is warm and dry.      Comments: Marked erythema and induration with warmth and swelling of left index finger. Puncture wounds from cat bite overlying middle phalanx (radially and ventrally). Erythema involves finger except the most distal end  and extends onto the palm of the head overlying the 2nd and 3rd MCP heads. Pt has sensation and good color of distal finger. No red streaking up arm. Good radial pulse   Neurological:      General: No focal deficit present.      Mental Status: She is alert.      Assessment:      1. Encounter related to worker's  compensation claim    2. Cat bite of finger, initial encounter      Plan:       Medications Ordered This Encounter   Medications    amoxicillin-clavulanate 875-125mg (AUGMENTIN) 875-125 mg per tablet     Sig: Take 1 tablet by mouth 2 (two) times daily. for 10 days     Dispense:  20 tablet     Refill:  0   Rest, warm soaks for 15-20 minutes -3 times a day.   If not improved or worse by Monday then must be seen at [facility]. If she feels worse before that then rtc Use ibuprofen (may alternate with tylenol)OTC for pain    Pt or guardian provided educational materials and instructions regarding their visit diagnosis.       Follow up : Per [facility]

## 2023-08-12 NOTE — LETTER
Urgent Care - [facility]  [address] [address]  Phone: [phone]  Fax: [phone]  [facility] [employer]: [phone]    Pt Name: Milka Smith  Injury Date: 08/12/2023   Employee ID:  Date of First Treatment: 08/12/2023   Company: [employer] reference to record [id number]      Appointment Time: 12:00 PM Arrived: 12:29 PM   Provider: Yoanna Guerra DO Time Out:1:10 PM     Office Treatment:   1. Cat bite of finger, initial encounter    2. Encounter related to worker's compensation claim      Medications Ordered This Encounter   Medications    amoxicillin-clavulanate 875-125mg (AUGMENTIN) 875-125 mg per tablet                 Return Appointment: PRN per [facility]

## 2023-08-12 NOTE — LETTER
Urgent Care - [facility]  [address] [facility] [address]  Phone: [phone]  Fax: [phone]  [facility] [facility]: [phone]    Pt Name: Milka Smith  Injury Date: 08/12/2023   Employee ID: [id number] Date of First Treatment: 08/12/2023   Company: Networked reference to record [id number]      Appointment Time: 12:00 PM Arrived: 12:29 PM   Provider: Yoanna Guerra DO Time Out:1:10 PM     Office Treatment:   1. Cat bite of finger, initial encounter    2. Encounter related to worker's compensation claim      Medications Ordered This Encounter   Medications    amoxicillin-clavulanate 875-125mg (AUGMENTIN) 875-125 mg per tablet                 Return Appointment: per [facility]

## 2023-08-14 ENCOUNTER — TELEPHONE (OUTPATIENT)
Dept: URGENT CARE | Facility: CLINIC | Age: 28
End: 2023-08-14
Payer: COMMERCIAL

## 2023-08-14 NOTE — TELEPHONE ENCOUNTER
Called patient to get him scheduled with [facility]. Left message and call back number.

## 2023-08-21 ENCOUNTER — OFFICE VISIT (OUTPATIENT)
Dept: PSYCHIATRY | Facility: CLINIC | Age: 28
End: 2023-08-21
Payer: COMMERCIAL

## 2023-08-21 VITALS
HEART RATE: 95 BPM | SYSTOLIC BLOOD PRESSURE: 107 MMHG | WEIGHT: 139 LBS | BODY MASS INDEX: 20.53 KG/M2 | DIASTOLIC BLOOD PRESSURE: 70 MMHG

## 2023-08-21 DIAGNOSIS — F90.2 ADHD (ATTENTION DEFICIT HYPERACTIVITY DISORDER), COMBINED TYPE: ICD-10-CM

## 2023-08-21 DIAGNOSIS — F41.1 GENERALIZED ANXIETY DISORDER: ICD-10-CM

## 2023-08-21 PROCEDURE — 1160F RVW MEDS BY RX/DR IN RCRD: CPT | Mod: CPTII,S$GLB,, | Performed by: PHYSICIAN ASSISTANT

## 2023-08-21 PROCEDURE — 1160F PR REVIEW ALL MEDS BY PRESCRIBER/CLIN PHARMACIST DOCUMENTED: ICD-10-PCS | Mod: CPTII,S$GLB,, | Performed by: PHYSICIAN ASSISTANT

## 2023-08-21 PROCEDURE — 99999 PR PBB SHADOW E&M-EST. PATIENT-LVL III: ICD-10-PCS | Mod: PBBFAC,,, | Performed by: PHYSICIAN ASSISTANT

## 2023-08-21 PROCEDURE — 3008F PR BODY MASS INDEX (BMI) DOCUMENTED: ICD-10-PCS | Mod: CPTII,S$GLB,, | Performed by: PHYSICIAN ASSISTANT

## 2023-08-21 PROCEDURE — 3074F SYST BP LT 130 MM HG: CPT | Mod: CPTII,S$GLB,, | Performed by: PHYSICIAN ASSISTANT

## 2023-08-21 PROCEDURE — 99214 PR OFFICE/OUTPT VISIT, EST, LEVL IV, 30-39 MIN: ICD-10-PCS | Mod: S$GLB,,, | Performed by: PHYSICIAN ASSISTANT

## 2023-08-21 PROCEDURE — 3078F PR MOST RECENT DIASTOLIC BLOOD PRESSURE < 80 MM HG: ICD-10-PCS | Mod: CPTII,S$GLB,, | Performed by: PHYSICIAN ASSISTANT

## 2023-08-21 PROCEDURE — 99214 OFFICE O/P EST MOD 30 MIN: CPT | Mod: S$GLB,,, | Performed by: PHYSICIAN ASSISTANT

## 2023-08-21 PROCEDURE — 1159F MED LIST DOCD IN RCRD: CPT | Mod: CPTII,S$GLB,, | Performed by: PHYSICIAN ASSISTANT

## 2023-08-21 PROCEDURE — 3078F DIAST BP <80 MM HG: CPT | Mod: CPTII,S$GLB,, | Performed by: PHYSICIAN ASSISTANT

## 2023-08-21 PROCEDURE — 3008F BODY MASS INDEX DOCD: CPT | Mod: CPTII,S$GLB,, | Performed by: PHYSICIAN ASSISTANT

## 2023-08-21 PROCEDURE — 3074F PR MOST RECENT SYSTOLIC BLOOD PRESSURE < 130 MM HG: ICD-10-PCS | Mod: CPTII,S$GLB,, | Performed by: PHYSICIAN ASSISTANT

## 2023-08-21 PROCEDURE — 99999 PR PBB SHADOW E&M-EST. PATIENT-LVL III: CPT | Mod: PBBFAC,,, | Performed by: PHYSICIAN ASSISTANT

## 2023-08-21 PROCEDURE — 1159F PR MEDICATION LIST DOCUMENTED IN MEDICAL RECORD: ICD-10-PCS | Mod: CPTII,S$GLB,, | Performed by: PHYSICIAN ASSISTANT

## 2023-08-21 RX ORDER — ESCITALOPRAM OXALATE 10 MG/1
10 TABLET ORAL DAILY
Qty: 90 TABLET | Refills: 0 | Status: SHIPPED | OUTPATIENT
Start: 2023-08-21 | End: 2024-03-04 | Stop reason: SDUPTHER

## 2023-08-21 RX ORDER — DEXTROAMPHETAMINE SACCHARATE, AMPHETAMINE ASPARTATE MONOHYDRATE, DEXTROAMPHETAMINE SULFATE AND AMPHETAMINE SULFATE 5; 5; 5; 5 MG/1; MG/1; MG/1; MG/1
20 CAPSULE, EXTENDED RELEASE ORAL EVERY MORNING
Qty: 30 CAPSULE | Refills: 0 | Status: SHIPPED | OUTPATIENT
Start: 2023-10-07 | End: 2024-03-04 | Stop reason: SDUPTHER

## 2023-08-21 RX ORDER — DEXTROAMPHETAMINE SACCHARATE, AMPHETAMINE ASPARTATE MONOHYDRATE, DEXTROAMPHETAMINE SULFATE AND AMPHETAMINE SULFATE 5; 5; 5; 5 MG/1; MG/1; MG/1; MG/1
20 CAPSULE, EXTENDED RELEASE ORAL EVERY MORNING
Qty: 30 CAPSULE | Refills: 0 | Status: SHIPPED | OUTPATIENT
Start: 2023-09-07 | End: 2024-03-04 | Stop reason: SDUPTHER

## 2023-08-21 RX ORDER — DEXTROAMPHETAMINE SACCHARATE, AMPHETAMINE ASPARTATE MONOHYDRATE, DEXTROAMPHETAMINE SULFATE AND AMPHETAMINE SULFATE 5; 5; 5; 5 MG/1; MG/1; MG/1; MG/1
20 CAPSULE, EXTENDED RELEASE ORAL EVERY MORNING
Qty: 30 CAPSULE | Refills: 0 | Status: SHIPPED | OUTPATIENT
Start: 2023-11-07 | End: 2024-03-04 | Stop reason: SDUPTHER

## 2023-08-21 NOTE — PROGRESS NOTES
"Outpatient Psychiatry Follow-Up Visit (PA)    08/21/2023    Clinical Status of Patient:  Outpatient (Ambulatory)    Chief Complaint:  Milka Smith is a 28 y.o. female who presents today for follow-up of anxiety.  Met with patient.     Current Medications:   lexapro 10 mg  Adderall XR 20 mg    Interval History and Content of Current Session:  Patient seen and chart reviewed. Last seen on 5/29//2023    Patient has a psychiatric history of: MDD, BRET, ADHD    Patient presents for follow up today after switching back to Adderall XR 20 mg    Patient states she has been doing well.   She and her boyfriend have moved in together.    Patient reports Adderall XR 20 mg is going well- she states "its makes me feel normal, like a normal functioning person". She reports feeling she is getting "my life back on track."  She reports the dose is working well, no ASE.     Her mood has been good.   Her anxiety has been well controlled.   She reports her temper is better, "I don't let things get to me".    She reports lexapro is working well, states "I really like it".    She denies ASE from adderall- denies tachycardia, palpitations, anxiety.   She endorses decreased libido with lexapro but is not wanting to make changes.     She is sleeping well.   She is eating "okay." She reports she often forgets to eat, has to remind herself to eat.   She denies decreased appetite when starting Adderall, reports it started during covid- reports she noticed the weight loss during covid.     She is doing well at work.         Psychotherapy:  Target symptoms: distractability, lack of focus, anxiety   Why chosen therapy is appropriate versus another modality: relevant to diagnosis, evidence based practice  Outcome monitoring methods: self-report  Therapeutic intervention type: supportive psychotherapy  Topics discussed/themes: building skills sets for symptom management, symptom recognition  The patient's response to the intervention is " "accepting. The patient's progress toward treatment goals is good.   Duration of intervention: 8 minutes.    Review of Systems   PSYCHIATRIC: Pertinant items are noted in the narrative.    Past Medication Trials:  "SSRIs"  Adderall- worked well, shortage    Past Medical, Family and Social History: The patient's past medical, family and social history have been reviewed and updated as appropriate within the electronic medical record - see encounter notes.  Patient cleared by cardiology to take ADHD stimulant, lexapro    Compliance: yes    Side effects: None    Risk Parameters:  Patient reports no suicidal ideation  Patient reports no homicidal ideation  Patient reports no self-injurious behavior  Patient reports no violent behavior    Exam (detailed: at least 9 elements; comprehensive: all 15 elements)   Constitutional  Vitals:  Most recent vital signs, dated less than 90 days prior to this appointment, were reviewed.   Vitals:    08/21/23 1345   BP: 107/70   Pulse: 95   Weight: 63 kg (139 lb)            General:  unremarkable, age appropriate, casually dressed, neatly groomed     Musculoskeletal  Muscle Strength/Tone:  not examined   Gait & Station:  non-ataxic     Psychiatric  Speech:  no latency; no press   Mood & Affect:  steady, euthymic  congruent and appropriate   Thought Process:  normal and logical   Associations:  intact   Thought Content:  normal, no suicidality, no homicidality, delusions, or paranoia   Insight:  intact   Judgement: behavior is adequate to circumstances   Orientation:  grossly intact   Memory: intact for content of interview   Language: grossly intact   Attention Span & Concentration:  able to focus   Fund of Knowledge:  intact and appropriate to age and level of education     Assessment and Diagnosis   Status/Progress: Based on the examination today, the patient's problem(s) is/are improved.  New problems have not been presented today.   Lack of compliance are not complicating management " of the primary condition.  There are no active rule-out diagnoses for this patient at this time.     General Impression: Patient is a 28 year old female with a psychiatric history of anxiety and ADHD. Patient reports significant improvement in her symptoms since starting lexapro 10 mg and Adderall XR 20 mg    Patient is stable, doing well.       ICD-10-CM ICD-9-CM    1. Generalized anxiety disorder  F41.1 300.02 EScitalopram oxalate (LEXAPRO) 10 MG tablet      2. ADHD (attention deficit hyperactivity disorder), combined type  F90.2 314.01 dextroamphetamine-amphetamine (ADDERALL XR) 20 MG 24 hr capsule      dextroamphetamine-amphetamine (ADDERALL XR) 20 MG 24 hr capsule      dextroamphetamine-amphetamine (ADDERALL XR) 20 MG 24 hr capsule                    Intervention/Counseling/Treatment Plan   Medication Management: The risks and benefits of medication were discussed with the patient.   Continue lexapro 10 mg  Continue Adderall XR 20 mg  Discussed diagnosis, risk and benefits of proposed treatment above vs alternative treatment vs no treatment, and potential side effects of these treatments, and the inherent unpredictability of individual responses to these treatments. The patient expresses understanding and gives informed consent to pursue treatment at this time, believing that the potential benefits outweigh the potential risks. Patient has no other questions.   Patient has no contraindications: no h/o allergic rxn, agitation, anxiety, tourette's, arrythmia, cardiovascular disease, cardiac structural abnormalities, hyperthyroidism, glaucoma, Other psychiatric illness, etc.   Discussed diagnosis, risks and benefits of proposed treatment vs alternative treatments vs no treatment, and potential side effects of these treatments (death, dependency, psychosis, tino, aggression, HTN, ticks, MI, stroke, arrythmia, seizure, anaphylaxis or other allergic reactions, leukopenia, nervousness, anorexia, insomnia,  tachycardia, palpitations, dizziness, BP changes, HR changes, visual disturbance, etc.). The patient expresses understanding of the above and displays the capacity to agree with this treatment given said understanding. Patient also agrees that, currently, the benefits outweigh the risks and would like to pursue treatment at this time.  Patient voices understanding and agreement with this plan  Encouraged patient to keep future appointments  Instruct patient to call or message with questions  In the event of an emergency, including suicidal ideation, patient was advised to go to the emergency room      Return to Clinic: 3 months    Sangita Shepard PA-C

## 2024-03-04 ENCOUNTER — OFFICE VISIT (OUTPATIENT)
Dept: PSYCHIATRY | Facility: CLINIC | Age: 29
End: 2024-03-04
Payer: COMMERCIAL

## 2024-03-04 VITALS
DIASTOLIC BLOOD PRESSURE: 74 MMHG | HEART RATE: 89 BPM | SYSTOLIC BLOOD PRESSURE: 116 MMHG | BODY MASS INDEX: 25.35 KG/M2 | WEIGHT: 171.63 LBS

## 2024-03-04 DIAGNOSIS — F90.2 ADHD (ATTENTION DEFICIT HYPERACTIVITY DISORDER), COMBINED TYPE: ICD-10-CM

## 2024-03-04 DIAGNOSIS — F41.1 GENERALIZED ANXIETY DISORDER: ICD-10-CM

## 2024-03-04 PROCEDURE — 99999 PR PBB SHADOW E&M-EST. PATIENT-LVL III: CPT | Mod: PBBFAC,,, | Performed by: PHYSICIAN ASSISTANT

## 2024-03-04 PROCEDURE — 1160F RVW MEDS BY RX/DR IN RCRD: CPT | Mod: CPTII,S$GLB,, | Performed by: PHYSICIAN ASSISTANT

## 2024-03-04 PROCEDURE — 1159F MED LIST DOCD IN RCRD: CPT | Mod: CPTII,S$GLB,, | Performed by: PHYSICIAN ASSISTANT

## 2024-03-04 PROCEDURE — 3074F SYST BP LT 130 MM HG: CPT | Mod: CPTII,S$GLB,, | Performed by: PHYSICIAN ASSISTANT

## 2024-03-04 PROCEDURE — 99214 OFFICE O/P EST MOD 30 MIN: CPT | Mod: S$GLB,,, | Performed by: PHYSICIAN ASSISTANT

## 2024-03-04 PROCEDURE — 3008F BODY MASS INDEX DOCD: CPT | Mod: CPTII,S$GLB,, | Performed by: PHYSICIAN ASSISTANT

## 2024-03-04 PROCEDURE — 3078F DIAST BP <80 MM HG: CPT | Mod: CPTII,S$GLB,, | Performed by: PHYSICIAN ASSISTANT

## 2024-03-04 RX ORDER — DEXTROAMPHETAMINE SACCHARATE, AMPHETAMINE ASPARTATE MONOHYDRATE, DEXTROAMPHETAMINE SULFATE AND AMPHETAMINE SULFATE 5; 5; 5; 5 MG/1; MG/1; MG/1; MG/1
20 CAPSULE, EXTENDED RELEASE ORAL EVERY MORNING
Qty: 30 CAPSULE | Refills: 0 | Status: SHIPPED | OUTPATIENT
Start: 2024-05-04 | End: 2024-05-21 | Stop reason: SDUPTHER

## 2024-03-04 RX ORDER — DEXTROAMPHETAMINE SACCHARATE, AMPHETAMINE ASPARTATE MONOHYDRATE, DEXTROAMPHETAMINE SULFATE AND AMPHETAMINE SULFATE 5; 5; 5; 5 MG/1; MG/1; MG/1; MG/1
20 CAPSULE, EXTENDED RELEASE ORAL EVERY MORNING
Qty: 30 CAPSULE | Refills: 0 | Status: SHIPPED | OUTPATIENT
Start: 2024-03-04 | End: 2024-05-21 | Stop reason: SDUPTHER

## 2024-03-04 RX ORDER — ESCITALOPRAM OXALATE 10 MG/1
10 TABLET ORAL DAILY
Qty: 90 TABLET | Refills: 0 | Status: SHIPPED | OUTPATIENT
Start: 2024-03-04 | End: 2024-06-02

## 2024-03-04 RX ORDER — DEXTROAMPHETAMINE SACCHARATE, AMPHETAMINE ASPARTATE MONOHYDRATE, DEXTROAMPHETAMINE SULFATE AND AMPHETAMINE SULFATE 5; 5; 5; 5 MG/1; MG/1; MG/1; MG/1
20 CAPSULE, EXTENDED RELEASE ORAL EVERY MORNING
Qty: 30 CAPSULE | Refills: 0 | Status: SHIPPED | OUTPATIENT
Start: 2024-04-04 | End: 2024-05-21 | Stop reason: SDUPTHER

## 2024-05-21 ENCOUNTER — OFFICE VISIT (OUTPATIENT)
Dept: PSYCHIATRY | Facility: CLINIC | Age: 29
End: 2024-05-21
Payer: COMMERCIAL

## 2024-05-21 DIAGNOSIS — F41.1 GENERALIZED ANXIETY DISORDER: ICD-10-CM

## 2024-05-21 DIAGNOSIS — F90.2 ADHD (ATTENTION DEFICIT HYPERACTIVITY DISORDER), COMBINED TYPE: ICD-10-CM

## 2024-05-21 PROCEDURE — 1159F MED LIST DOCD IN RCRD: CPT | Mod: CPTII,95,, | Performed by: PHYSICIAN ASSISTANT

## 2024-05-21 PROCEDURE — 99214 OFFICE O/P EST MOD 30 MIN: CPT | Mod: 95,,, | Performed by: PHYSICIAN ASSISTANT

## 2024-05-21 PROCEDURE — 1160F RVW MEDS BY RX/DR IN RCRD: CPT | Mod: CPTII,95,, | Performed by: PHYSICIAN ASSISTANT

## 2024-05-21 NOTE — PROGRESS NOTES
"The patient location is: [place], [place]  The chief complaint leading to consultation is: f/u    Visit type: audiovisual    Face to Face time with patient: 12  15 minutes of total time spent on the encounter, which includes face to face time and non-face to face time preparing to see the patient (eg, review of tests), Obtaining and/or reviewing separately obtained history, Documenting clinical information in the electronic or other health record, Independently interpreting results (not separately reported) and communicating results to the patient/family/caregiver, or Care coordination (not separately reported).         Each patient to whom he or she provides medical services by telemedicine is:  (1) informed of the relationship between the physician and patient and the respective role of any other health care provider with respect to management of the patient; and (2) notified that he or she may decline to receive medical services by telemedicine and may withdraw from such care at any time.    Notes:    Outpatient Psychiatry Follow-Up Visit (PA)    05/21/2024    Clinical Status of Patient:  Outpatient (Ambulatory)    Chief Complaint:  Milka Smith is a 28 y.o. female who presents today for follow-up of anxiety.  Met with patient.     Current Medications:   lexapro 10 mg  Adderall XR 20 mg  Propanolol 10 mg BID    Interval History and Content of Current Session:  Patient seen and chart reviewed. Last seen on 3/4/2024/2023    Patient has a psychiatric history of: MDD, BRET, ADHD    Patient presents for follow up today stating "I'm doing really good."    Patient reports the medications "are doing fantastic" and is feeling "more confident." She reports this has been evident in both her personal and work life.   She notes a "180 difference" compared to prior to starting medications.     She denies any depression, sadness, irritability, or anger.   She reports her anxiety is "so much better."  She also notes " "situational anxiety has improved with propanolol.     She continues to report benefit with Adderall, reports since restarting she is "more on top of things", reports less misplacing objects, and is "more fluent with my words", reports a "more steady flow of thoughts."  She has been getting positive feed back at work, also reports her parents have noticed improvement.     She denies any ASE from medications; denies tachycardia, palpitations, or chest pain with adderall.   She denies any hypotension, orthostatic hypotension with propanolol.     She is eating well, reports she has been eating better, 3 meals a day and has gained some positive weight.     Patient is doing well, affect is bright, patient states "I feel great, fantastic"    No si/hi/self harm      Psychotherapy:  Target symptoms: distractability, lack of focus, anxiety   Why chosen therapy is appropriate versus another modality: relevant to diagnosis, evidence based practice  Outcome monitoring methods: self-report  Therapeutic intervention type: supportive psychotherapy  Topics discussed/themes: building skills sets for symptom management, symptom recognition  The patient's response to the intervention is accepting. The patient's progress toward treatment goals is excellent.   Duration of intervention: 8 minutes.    Review of Systems   PSYCHIATRIC: Pertinant items are noted in the narrative.    Past Medication Trials:  "SSRIs"  Adderall- worked well, shortage  Vyvanse- worked well    Past Medical, Family and Social History: The patient's past medical, family and social history have been reviewed and updated as appropriate within the electronic medical record - see encounter notes.  Patient cleared by cardiology to take ADHD stimulant, lexapro    Compliance: yes    Side effects: None    Risk Parameters:  Patient reports no suicidal ideation  Patient reports no homicidal ideation  Patient reports no self-injurious behavior  Patient reports no violent " "behavior    Exam (detailed: at least 9 elements; comprehensive: all 15 elements)   Constitutional  Vitals:  Most recent vital signs, dated less than 90 days prior to this appointment, were reviewed.   There were no vitals filed for this visit.             General:  unremarkable, age appropriate, casually dressed, neatly groomed     Musculoskeletal  Muscle Strength/Tone:  not examined   Gait & Station:  virtual     Psychiatric  Speech:  no latency; no press   Mood & Affect:  steady, euthymic  congruent and appropriate   Thought Process:  normal and logical   Associations:  intact   Thought Content:  normal, no suicidality, no homicidality, delusions, or paranoia   Insight:  intact   Judgement: behavior is adequate to circumstances   Orientation:  grossly intact   Memory: intact for content of interview   Language: grossly intact   Attention Span & Concentration:  able to focus   Fund of Knowledge:  intact and appropriate to age and level of education     Assessment and Diagnosis   Status/Progress: Based on the examination today, the patient's problem(s) is/are improved.  New problems have not been presented today.   Lack of compliance are not complicating management of the primary condition.  There are no active rule-out diagnoses for this patient at this time.     General Impression: Patient is a 28 year old female with a psychiatric history of anxiety and ADHD. Patient reports significant improvement in her symptoms since starting lexapro 10 mg,  Adderall XR 20 mg, and propanolol 10 mg BID. Patient's affect is bright, she denies depression, anxiety, is "feeling great, fantastic.". Patient is doing well.        ICD-10-CM ICD-9-CM    1. Generalized anxiety disorder  F41.1 300.02       2. ADHD (attention deficit hyperactivity disorder), combined type  F90.2 314.01 dextroamphetamine-amphetamine (ADDERALL XR) 20 MG 24 hr capsule      dextroamphetamine-amphetamine (ADDERALL XR) 20 MG 24 hr capsule      " dextroamphetamine-amphetamine (ADDERALL XR) 20 MG 24 hr capsule                Intervention/Counseling/Treatment Plan   Medication Management: The risks and benefits of medication were discussed with the patient.   Continue lexapro 10 mg  Continue Adderall XR 20 mg  Continue propanolol 10 mg BID per PCP  Discussed diagnosis, risk and benefits of proposed treatment above vs alternative treatment vs no treatment, and potential side effects of these treatments, and the inherent unpredictability of individual responses to these treatments. The patient expresses understanding and gives informed consent to pursue treatment at this time, believing that the potential benefits outweigh the potential risks. Patient has no other questions.   Patient has no contraindications: no h/o allergic rxn, agitation, anxiety, tourette's, arrythmia, cardiovascular disease, cardiac structural abnormalities, hyperthyroidism, glaucoma, Other psychiatric illness, etc.   Discussed diagnosis, risks and benefits of proposed treatment vs alternative treatments vs no treatment, and potential side effects of these treatments (death, dependency, psychosis, tino, aggression, HTN, ticks, MI, stroke, arrythmia, seizure, anaphylaxis or other allergic reactions, leukopenia, nervousness, anorexia, insomnia, tachycardia, palpitations, dizziness, BP changes, HR changes, visual disturbance, etc.). The patient expresses understanding of the above and displays the capacity to agree with this treatment given said understanding. Patient also agrees that, currently, the benefits outweigh the risks and would like to pursue treatment at this time.  Patient voices understanding and agreement with this plan  Encouraged patient to keep future appointments  Instruct patient to call or message with questions  In the event of an emergency, including suicidal ideation, patient was advised to go to the emergency room      Return to Clinic: 3 months    Sangita Shepard  POPPY

## 2024-05-22 RX ORDER — DEXTROAMPHETAMINE SACCHARATE, AMPHETAMINE ASPARTATE MONOHYDRATE, DEXTROAMPHETAMINE SULFATE AND AMPHETAMINE SULFATE 5; 5; 5; 5 MG/1; MG/1; MG/1; MG/1
20 CAPSULE, EXTENDED RELEASE ORAL EVERY MORNING
Qty: 30 CAPSULE | Refills: 0 | Status: SHIPPED | OUTPATIENT
Start: 2024-07-14

## 2024-05-22 RX ORDER — DEXTROAMPHETAMINE SACCHARATE, AMPHETAMINE ASPARTATE MONOHYDRATE, DEXTROAMPHETAMINE SULFATE AND AMPHETAMINE SULFATE 5; 5; 5; 5 MG/1; MG/1; MG/1; MG/1
20 CAPSULE, EXTENDED RELEASE ORAL EVERY MORNING
Qty: 30 CAPSULE | Refills: 0 | Status: SHIPPED | OUTPATIENT
Start: 2024-06-14

## 2024-05-22 RX ORDER — DEXTROAMPHETAMINE SACCHARATE, AMPHETAMINE ASPARTATE MONOHYDRATE, DEXTROAMPHETAMINE SULFATE AND AMPHETAMINE SULFATE 5; 5; 5; 5 MG/1; MG/1; MG/1; MG/1
20 CAPSULE, EXTENDED RELEASE ORAL EVERY MORNING
Qty: 30 CAPSULE | Refills: 0 | Status: SHIPPED | OUTPATIENT
Start: 2024-08-14

## 2024-08-07 ENCOUNTER — PATIENT MESSAGE (OUTPATIENT)
Dept: PSYCHIATRY | Facility: CLINIC | Age: 29
End: 2024-08-07
Payer: COMMERCIAL

## 2024-08-20 DIAGNOSIS — F41.1 GENERALIZED ANXIETY DISORDER: ICD-10-CM

## 2024-08-20 RX ORDER — ESCITALOPRAM OXALATE 10 MG/1
10 TABLET ORAL DAILY
Qty: 90 TABLET | Refills: 0 | Status: SHIPPED | OUTPATIENT
Start: 2024-08-20 | End: 2024-11-18

## 2024-08-26 NOTE — PROGRESS NOTES
"The patient location is: [facility]  The chief complaint leading to consultation is: f/u    Visit type: audiovisual    Face to Face time with patient: 12  15 minutes of total time spent on the encounter, which includes face to face time and non-face to face time preparing to see the patient (eg, review of tests), Obtaining and/or reviewing separately obtained history, Documenting clinical information in the electronic or other health record, Independently interpreting results (not separately reported) and communicating results to the patient/family/caregiver, or Care coordination (not separately reported).         Each patient to whom he or she provides medical services by telemedicine is:  (1) informed of the relationship between the physician and patient and the respective role of any other health care provider with respect to management of the patient; and (2) notified that he or she may decline to receive medical services by telemedicine and may withdraw from such care at any time.    Notes:    Outpatient Psychiatry Follow-Up Visit (PA)    08/26/2024    Clinical Status of Patient:  Outpatient (Ambulatory)    Chief Complaint:  Milka Smith is a 29 y.o. female who presents today for follow-up of anxiety.  Met with patient.     Current Medications:   Lexapro 10 mg  Adderall XR 20 mg  Propanolol 10 mg BID    Interval History and Content of Current Session:  Patient seen and chart reviewed. Last seen on 5/21/2024    Patient has a psychiatric history of: MDD, BRET, ADHD    Patient presents for follow up today stating she's been doing "well overall."    Her mood has been "stable". She denies any depression at this time.     Her anxiety has been good, "much more manageable".  She continues to endorse benefit with lexapro 10 mg, and reports propanolol 10 mg BID per PCP also helping with anxiety.    She denies ASE from medications    Patient reports feeling the Adderall XR 20 mg is not as effective, " "reports "noticing I'm going back to my old ways", with losing things, forgetfulness, difficulty with expressing works, states "my brain is lagging a little bit."  She reports she has been feeling this way for about 1 month, notes experiencing in the past, improved when increased dose.   She denies ASE; no tachycardia, palpitations, or chest pain    She is sleeping and eating well.     No si/hi/self harm    Of note, per  patient prescribed sublocade monthly. When inquiring, patient reports she "went to a facility" in January and has been taking since. Patient remains vague. Will continue to monitor closely.       Psychotherapy:  Target symptoms: distractability, lack of focus  Why chosen therapy is appropriate versus another modality: relevant to diagnosis, evidence based practice  Outcome monitoring methods: self-report  Therapeutic intervention type: supportive psychotherapy  Topics discussed/themes: symptom recognition  The patient's response to the intervention is accepting. The patient's progress toward treatment goals is good.   Duration of intervention: 8 minutes.    Review of Systems   PSYCHIATRIC: Pertinant items are noted in the narrative.    Past Medication Trials:  "SSRIs"  Adderall- worked well, shortage  Vyvanse- worked well    Past Medical, Family and Social History: The patient's past medical, family and social history have been reviewed and updated as appropriate within the electronic medical record - see encounter notes.  Patient cleared by cardiology to take ADHD stimulant, lexapro    Compliance: yes    Side effects: None    Risk Parameters:  Patient reports no suicidal ideation  Patient reports no homicidal ideation  Patient reports no self-injurious behavior  Patient reports no violent behavior    Exam (detailed: at least 9 elements; comprehensive: all 15 elements)   Constitutional  Vitals:  Most recent vital signs, dated greater than 90 days prior to this appointment, were reviewed.   There " were no vitals filed for this visit.             General:  unremarkable, age appropriate, casually dressed, neatly groomed     Musculoskeletal  Muscle Strength/Tone:  not examined   Gait & Station:  virtual     Psychiatric  Speech:  no latency; no press   Mood & Affect:  steady, euthymic  congruent and appropriate   Thought Process:  normal and logical   Associations:  intact   Thought Content:  normal, no suicidality, no homicidality, delusions, or paranoia   Insight:  intact   Judgement: behavior is adequate to circumstances   Orientation:  grossly intact   Memory: intact for content of interview   Language: grossly intact   Attention Span & Concentration:  able to focus   Fund of Knowledge:  intact and appropriate to age and level of education     Assessment and Diagnosis   Status/Progress: Based on the examination today, the patient's problem(s) is/are adequately but not ideally controlled.  New problems have not been presented today.   Lack of compliance are not complicating management of the primary condition.  There are no active rule-out diagnoses for this patient at this time.     General Impression: Patient is a 29 year old female with a psychiatric history of anxiety and ADHD. Patient reports significant improvement in her symptoms since starting lexapro 10 mg,  Adderall XR 20 mg, and propanolol 10 mg BID. Today, patient reports decreased efficacy in Adderall XR 20 mg, with increased forgetfulness, distractibility, and difficulty with articulation. she denies depression, or anxiety    Patient's affect is bright, no SI/HI/self harm        ICD-10-CM ICD-9-CM    1. ADHD (attention deficit hyperactivity disorder), combined type  F90.2 314.01 dextroamphetamine-amphetamine (ADDERALL XR) 25 MG 24 hr capsule      dextroamphetamine-amphetamine (ADDERALL XR) 25 MG 24 hr capsule      dextroamphetamine-amphetamine (ADDERALL XR) 25 MG 24 hr capsule      2. Generalized anxiety disorder  F41.1 300.02                    Intervention/Counseling/Treatment Plan   Medication Management: The risks and benefits of medication were discussed with the patient.   Continue lexapro 10 mg  Increase to Adderall XR 25 mg  Continue propanolol 10 mg BID per PCP  Discussed diagnosis, risk and benefits of proposed treatment above vs alternative treatment vs no treatment, and potential side effects of these treatments, and the inherent unpredictability of individual responses to these treatments. The patient expresses understanding and gives informed consent to pursue treatment at this time, believing that the potential benefits outweigh the potential risks. Patient has no other questions.   Patient has no contraindications: no h/o allergic rxn, agitation, anxiety, tourette's, arrythmia, cardiovascular disease, cardiac structural abnormalities, hyperthyroidism, glaucoma, Other psychiatric illness, etc.   Discussed diagnosis, risks and benefits of proposed treatment vs alternative treatments vs no treatment, and potential side effects of these treatments (death, dependency, psychosis, tino, aggression, HTN, ticks, MI, stroke, arrythmia, seizure, anaphylaxis or other allergic reactions, leukopenia, nervousness, anorexia, insomnia, tachycardia, palpitations, dizziness, BP changes, HR changes, visual disturbance, etc.). The patient expresses understanding of the above and displays the capacity to agree with this treatment given said understanding. Patient also agrees that, currently, the benefits outweigh the risks and would like to pursue treatment at this time.  Patient voices understanding and agreement with this plan  Encouraged patient to keep future appointments  Instruct patient to call or message with questions  In the event of an emergency, including suicidal ideation, patient was advised to go to the emergency room      Return to Clinic: 3 months    Sangita Shepard PA-C

## 2024-08-27 ENCOUNTER — OFFICE VISIT (OUTPATIENT)
Dept: PSYCHIATRY | Facility: CLINIC | Age: 29
End: 2024-08-27
Payer: COMMERCIAL

## 2024-08-27 ENCOUNTER — PATIENT MESSAGE (OUTPATIENT)
Dept: PSYCHIATRY | Facility: CLINIC | Age: 29
End: 2024-08-27

## 2024-08-27 DIAGNOSIS — F41.1 GENERALIZED ANXIETY DISORDER: ICD-10-CM

## 2024-08-27 DIAGNOSIS — F90.2 ADHD (ATTENTION DEFICIT HYPERACTIVITY DISORDER), COMBINED TYPE: Primary | ICD-10-CM

## 2024-08-27 DIAGNOSIS — F90.2 ADHD (ATTENTION DEFICIT HYPERACTIVITY DISORDER), COMBINED TYPE: ICD-10-CM

## 2024-08-27 PROCEDURE — 1159F MED LIST DOCD IN RCRD: CPT | Mod: CPTII,95,, | Performed by: PHYSICIAN ASSISTANT

## 2024-08-27 PROCEDURE — 1160F RVW MEDS BY RX/DR IN RCRD: CPT | Mod: CPTII,95,, | Performed by: PHYSICIAN ASSISTANT

## 2024-08-27 PROCEDURE — 99214 OFFICE O/P EST MOD 30 MIN: CPT | Mod: 95,,, | Performed by: PHYSICIAN ASSISTANT

## 2024-08-27 RX ORDER — DEXTROAMPHETAMINE SACCHARATE, AMPHETAMINE ASPARTATE MONOHYDRATE, DEXTROAMPHETAMINE SULFATE AND AMPHETAMINE SULFATE 6.25; 6.25; 6.25; 6.25 MG/1; MG/1; MG/1; MG/1
25 CAPSULE, EXTENDED RELEASE ORAL EVERY MORNING
Qty: 30 CAPSULE | Refills: 0 | Status: SHIPPED | OUTPATIENT
Start: 2024-08-27

## 2024-08-27 RX ORDER — DEXTROAMPHETAMINE SACCHARATE, AMPHETAMINE ASPARTATE MONOHYDRATE, DEXTROAMPHETAMINE SULFATE AND AMPHETAMINE SULFATE 6.25; 6.25; 6.25; 6.25 MG/1; MG/1; MG/1; MG/1
25 CAPSULE, EXTENDED RELEASE ORAL EVERY MORNING
Qty: 30 CAPSULE | Refills: 0 | Status: SHIPPED | OUTPATIENT
Start: 2024-08-27 | End: 2024-08-27 | Stop reason: SDUPTHER

## 2024-08-27 RX ORDER — DEXTROAMPHETAMINE SACCHARATE, AMPHETAMINE ASPARTATE MONOHYDRATE, DEXTROAMPHETAMINE SULFATE AND AMPHETAMINE SULFATE 6.25; 6.25; 6.25; 6.25 MG/1; MG/1; MG/1; MG/1
25 CAPSULE, EXTENDED RELEASE ORAL EVERY MORNING
Qty: 30 CAPSULE | Refills: 0 | Status: SHIPPED | OUTPATIENT
Start: 2024-10-27

## 2024-08-27 RX ORDER — DEXTROAMPHETAMINE SACCHARATE, AMPHETAMINE ASPARTATE MONOHYDRATE, DEXTROAMPHETAMINE SULFATE AND AMPHETAMINE SULFATE 6.25; 6.25; 6.25; 6.25 MG/1; MG/1; MG/1; MG/1
25 CAPSULE, EXTENDED RELEASE ORAL EVERY MORNING
Qty: 30 CAPSULE | Refills: 0 | Status: SHIPPED | OUTPATIENT
Start: 2024-09-27

## 2024-10-01 ENCOUNTER — PATIENT MESSAGE (OUTPATIENT)
Dept: PSYCHIATRY | Facility: CLINIC | Age: 29
End: 2024-10-01
Payer: COMMERCIAL

## 2024-10-01 DIAGNOSIS — F90.2 ADHD (ATTENTION DEFICIT HYPERACTIVITY DISORDER), COMBINED TYPE: ICD-10-CM

## 2024-10-01 RX ORDER — DEXTROAMPHETAMINE SACCHARATE, AMPHETAMINE ASPARTATE MONOHYDRATE, DEXTROAMPHETAMINE SULFATE AND AMPHETAMINE SULFATE 6.25; 6.25; 6.25; 6.25 MG/1; MG/1; MG/1; MG/1
25 CAPSULE, EXTENDED RELEASE ORAL EVERY MORNING
Qty: 30 CAPSULE | Refills: 0 | Status: SHIPPED | OUTPATIENT
Start: 2024-11-01

## 2024-10-01 RX ORDER — DEXTROAMPHETAMINE SACCHARATE, AMPHETAMINE ASPARTATE MONOHYDRATE, DEXTROAMPHETAMINE SULFATE AND AMPHETAMINE SULFATE 6.25; 6.25; 6.25; 6.25 MG/1; MG/1; MG/1; MG/1
25 CAPSULE, EXTENDED RELEASE ORAL EVERY MORNING
Qty: 30 CAPSULE | Refills: 0 | Status: SHIPPED | OUTPATIENT
Start: 2024-10-01

## 2024-10-22 ENCOUNTER — OFFICE VISIT (OUTPATIENT)
Dept: ORTHOPEDICS | Facility: CLINIC | Age: 29
End: 2024-10-22
Payer: COMMERCIAL

## 2024-10-22 ENCOUNTER — HOSPITAL ENCOUNTER (OUTPATIENT)
Dept: RADIOLOGY | Facility: HOSPITAL | Age: 29
Discharge: HOME | End: 2024-10-22
Attending: ORTHOPAEDIC SURGERY
Payer: COMMERCIAL

## 2024-10-22 DIAGNOSIS — M25.521 RIGHT ELBOW PAIN: Primary | ICD-10-CM

## 2024-10-22 DIAGNOSIS — M79.641 RIGHT HAND PAIN: ICD-10-CM

## 2024-10-22 DIAGNOSIS — M79.641 RIGHT HAND PAIN: Primary | ICD-10-CM

## 2024-10-22 PROCEDURE — 99204 OFFICE O/P NEW MOD 45 MIN: CPT | Mod: S$GLB,,, | Performed by: ORTHOPAEDIC SURGERY

## 2024-10-22 PROCEDURE — 73130 X-RAY EXAM OF HAND: CPT | Mod: 26,RT,, | Performed by: STUDENT IN AN ORGANIZED HEALTH CARE EDUCATION/TRAINING PROGRAM

## 2024-10-22 PROCEDURE — 1159F MED LIST DOCD IN RCRD: CPT | Mod: CPTII,S$GLB,, | Performed by: ORTHOPAEDIC SURGERY

## 2024-10-22 PROCEDURE — 73130 X-RAY EXAM OF HAND: CPT | Mod: TC,RT

## 2024-10-22 PROCEDURE — 99999 PR PBB SHADOW E&M-EST. PATIENT-LVL II: CPT | Mod: PBBFAC,,, | Performed by: ORTHOPAEDIC SURGERY

## 2024-10-22 NOTE — PROGRESS NOTES
[facility]  History & Physical  Orthopedics    SUBJECTIVE:      COVID-19 attestation:  This patient was treated during the COVID-19 pandemic.  This was discussed with the patient, they are aware of our current policies and procedures, were given the option of delaying their visit and or switching to a virtual visit, delaying their surgery when applicable, and they elect to proceed.    Chief Complaint: right hand numbness    Referring Provider: Self, Aaareferral     History of Present Illness:  Patient is a 29 y.o. right hand dominant female who presents today with complaints of transient right hand numbness that occurred about 1 week ago. She reports she was at work when her right hand, small finger, and ring finger went numb transiently. Since then, she has had tingling in her medial forearm and in the small and ring finger.    The patient is a/an  at a vet clinic.    Onset of symptoms/DOI was 1 week ago.    Symptoms are aggravated by activity.    Symptoms are alleviated by rest.    Symptoms consist of numbness/tingling.    The patient rates their pain as a 3/10.    Attempted treatment(s) and/or interventions include activity modifications, rest,  wrist brace .     The patient denies any fevers, chills, N/V, D/C and presents for evaluation.       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     Family History   Problem Relation Name Age of Onset    Colon cancer Father  42    Hypertension Father      Diabetes Paternal Grandmother      No Known Problems Mother      Deep vein thrombosis Maternal Grandmother      Cancer Maternal Grandmother      Heart disease Paternal Grandfather      Hypertension Paternal Grandfather       Breast cancer Unknown          maternal great aunt         Current Outpatient Medications:     dextroamphetamine-amphetamine (ADDERALL XR) 25 MG 24 hr capsule, Take 1 capsule (25 mg total) by mouth every morning., Disp: 30 capsule, Rfl: 0    dextroamphetamine-amphetamine (ADDERALL XR) 25 MG 24 hr capsule, Take 1 capsule (25 mg total) by mouth every morning., Disp: 30 capsule, Rfl: 0    [START ON 11/1/2024] dextroamphetamine-amphetamine (ADDERALL XR) 25 MG 24 hr capsule, Take 1 capsule (25 mg total) by mouth every morning., Disp: 30 capsule, Rfl: 0    EScitalopram oxalate (LEXAPRO) 10 MG tablet, Take 1 tablet (10 mg total) by mouth once daily., Disp: 90 tablet, Rfl: 0      Review of Systems:  As per HPI otherwise noncontributory    OBJECTIVE:      Vital Signs (Most Recent):  There were no vitals filed for this visit.  There is no height or weight on file to calculate BMI.      Physical Exam:  Constitutional: The patient appears well-developed and well-nourished. No distress.   Skin: No lesions appreciated  Head: Normocephalic and atraumatic.   Nose: Nose normal.   Ears: No deformities seen  Eyes: Conjunctivae and EOM are normal.   Neck: No tracheal deviation present.   Cardiovascular: Normal rate and intact distal pulses.    Pulmonary/Chest: Effort normal. No respiratory distress.   Abdominal: There is no guarding.   Neurological: The patient is alert.   Psychiatric: The patient has a normal mood and affect.     Right Hand/Wrist Examination:    Observation/Inspection:  Swelling  none    Deformity  none  Discoloration  none     Scars   none    Atrophy  none    HAND/WRIST EXAMINATION:  Finkelstein's Test   Neg  WHAT Test    Neg  Snuff box tenderness   Neg  James's Test    Neg  Hook of Hamate Tenderness  Neg  CMC grind    Neg  Circumduction test   Neg    Neurovascular Exam:  Digits WWP, brisk CR < 3s throughout  NVI motor/LTS to M/R/U nerves, radial pulse 2+  Tinel's Test - Carpal Tunnel  Neg  Tinel's Test - Cubital  Tunnel  Positive   Phalen's Test    Neg  Median Nerve Compression Test Neg    ROM hand full, painless    ROM wrist full, painless    ROM elbow full, painless    Abdomen not guarded  Respirations nonlabored  Perfusion intact    Diagnostic Results:     Imaging - I independently viewed the patient's imaging as well as the radiology report.  Xrays of the patient's right hand  demonstrate no evidence of any acute fractures or dislocations or significant degenerative changes.    EMG - none    ASSESSMENT/PLAN:      29 y.o. yo female with right possible transient cubital tunnel syndrome.    Plan: The patient and I had a thorough discussion today.  We discussed the working diagnosis as well as several other potential alternative diagnoses.  Treatment options were discussed, both conservative and surgical.  Conservative treatment options would include things such as activity modifications, workplace modifications, a period of rest, oral vs topical OTC and prescription anti-inflammatory medications, occupational therapy, splinting/bracing, immobilization, corticosteroid injections, and others.  Surgical options were discussed as well.     At this time, the patient would like to proceed with a trial of cubital tunnel elbow brace and EMG for further evaluation.    Should the patient's symptoms worsen, persist, or fail to improve they should return for reevaluation and I would be happy to see them back anytime.        Reji Cordon M.D.    Please be aware that this note has been generated with the assistance of [employer] voice-to-text.  Please excuse any spelling or grammatical errors.    Thank you for choosing Dr. Reji Cordon for your orthopedic hand and upper extremity care. It is our goal to provide you with exceptional care that will help keep you healthy, active, and get you back in the game.     If you felt that you received exemplary care today, please consider leaving feedback for Dr. Cordon on [employer] at  [email or web address].    Please do not hesitate to reach out to us via email, phone, or MyChart with any questions, concerns, or feedback.

## 2024-11-07 NOTE — PROGRESS NOTES
"The patient location is: [facility]  The chief complaint leading to consultation is: f/u    Visit type: audiovisual    Face to Face time with patient: 12  15 minutes of total time spent on the encounter, which includes face to face time and non-face to face time preparing to see the patient (eg, review of tests), Obtaining and/or reviewing separately obtained history, Documenting clinical information in the electronic or other health record, Independently interpreting results (not separately reported) and communicating results to the patient/family/caregiver, or Care coordination (not separately reported).         Each patient to whom he or she provides medical services by telemedicine is:  (1) informed of the relationship between the physician and patient and the respective role of any other health care provider with respect to management of the patient; and (2) notified that he or she may decline to receive medical services by telemedicine and may withdraw from such care at any time.    Notes:    Outpatient Psychiatry Follow-Up Visit (PA)    11/07/2024    Clinical Status of Patient:  Outpatient (Ambulatory)    Chief Complaint:  Milka Smith is a 29 y.o. female who presents today for follow-up of anxiety.  Met with patient.     Current Medications:   Lexapro 10 mg  Adderall XR 25 mg  Propanolol 10 mg BID    Interval History and Content of Current Session:  Patient seen and chart reviewed. Last seen on 8/27/2024    Patient has a psychiatric history of: MDD, BRET, ADHD    Patient presents for follow up today after increasing to Adderall XR 25 mg.     Patient states she is "doing well", is "doing good right now with where I'm at."  She reports her anxiety has been well controlled, "I really don't have anxiety".  She denies any recent over thinking, worrying, rumination, "I don't let it get to me."    Her mood has been good, "cheerful", denies any depression or irritability.   She has been more " "motivated to complete chores/tasks, clean at home.    Adderall XR 25 mg is going well, reports feeling "more on tract with my thoughts and words."  She reports improvement in focus, attention, brain fog, articulation,and task completion at work.     She denies any ASE with medications; no tachycardia, palpitations, or increased anxiety.     Work is going well, she is "in the groove of things."    She is sleeping and eating well.     She was diagnosed with carpal tunnel 3 weeks ago, but states "I'm not over stressed about it."    Per  patient receiving sublocade monthly. Patient reports she went to an inpatient treatment facility a year ago, at [facility] in [place] for daily use of kratom. Patient admits to not telling this provider, states "I kept it a secret from everybody." She reports no use in the last year, denies any substance use since stay at [facility], states "I don't drink, smoke, or anything." She denies any cravings for kratom, reports sublocade going well, though she hopes to be able to stop taking in the new year.       Psychotherapy:  Target symptoms: distractability, lack of focus, substance abuse  Why chosen therapy is appropriate versus another modality: relevant to diagnosis, evidence based practice  Outcome monitoring methods: self-report  Therapeutic intervention type: supportive psychotherapy  Topics discussed/themes: symptom recognition, substance abuse, resolved  The patient's response to the intervention is accepting. The patient's progress toward treatment goals is good.   Duration of intervention: 12 minutes.    Review of Systems   PSYCHIATRIC: Pertinant items are noted in the narrative.    Past Medication Trials:  "SSRIs"  Adderall- worked well, shortage  Vyvanse- worked well    Past Medical, Family and Social History: The patient's past medical, family and social history have been reviewed and updated as appropriate within the electronic medical record - see encounter notes.  Patient " cleared by cardiology to take ADHD stimulant, lexapro    Compliance: yes    Side effects: None    Risk Parameters:  Patient reports no suicidal ideation  Patient reports no homicidal ideation  Patient reports no self-injurious behavior  Patient reports no violent behavior    Exam (detailed: at least 9 elements; comprehensive: all 15 elements)   Constitutional  Vitals:  Most recent vital signs, dated greater than 90 days prior to this appointment, were reviewed.   There were no vitals filed for this visit.             General:  unremarkable, age appropriate, casually dressed, neatly groomed     Musculoskeletal  Muscle Strength/Tone:  not examined   Gait & Station:  virtual     Psychiatric  Speech:  no latency; no press   Mood & Affect:  steady, euthymic  congruent and appropriate   Thought Process:  normal and logical   Associations:  intact   Thought Content:  normal, no suicidality, no homicidality, delusions, or paranoia   Insight:  intact   Judgement: behavior is adequate to circumstances   Orientation:  grossly intact   Memory: intact for content of interview   Language: grossly intact   Attention Span & Concentration:  able to focus   Fund of Knowledge:  intact and appropriate to age and level of education     Assessment and Diagnosis   Status/Progress: Based on the examination today, the patient's problem(s) is/are improved.  New problems have not been presented today.   Lack of compliance are not complicating management of the primary condition.  There are no active rule-out diagnoses for this patient at this time.     General Impression: Patient is a 29 year old female with a psychiatric history of anxiety and ADHD. Patient reports significant improvement in her symptoms since starting lexapro 10 mg,  Adderall XR 25 mg, and propanolol 10 mg BID. Anxiety, mood, and ADHD well controlled. Today, she admits to history of abuse of kratom, for which she sought inpatient care and has been receiving sublocade with  no issues, no cravings.       Patient's affect is bright, no SI/HI/self harm        ICD-10-CM ICD-9-CM    1. Generalized anxiety disorder  F41.1 300.02       2. ADHD (attention deficit hyperactivity disorder), combined type  F90.2 314.01 dextroamphetamine-amphetamine (ADDERALL XR) 25 MG 24 hr capsule      dextroamphetamine-amphetamine (ADDERALL XR) 25 MG 24 hr capsule      dextroamphetamine-amphetamine (ADDERALL XR) 25 MG 24 hr capsule                Intervention/Counseling/Treatment Plan   Medication Management: The risks and benefits of medication were discussed with the patient.   Continue lexapro 10 mg  Continue Adderall XR 25 mg  Continue propanolol 10 mg BID per PCP  Continue sublocade per outside provider  Discussed diagnosis, risk and benefits of proposed treatment above vs alternative treatment vs no treatment, and potential side effects of these treatments, and the inherent unpredictability of individual responses to these treatments. The patient expresses understanding and gives informed consent to pursue treatment at this time, believing that the potential benefits outweigh the potential risks. Patient has no other questions.   Patient has no contraindications: no h/o allergic rxn, agitation, anxiety, tourette's, arrythmia, cardiovascular disease, cardiac structural abnormalities, hyperthyroidism, glaucoma, Other psychiatric illness, etc.   Discussed diagnosis, risks and benefits of proposed treatment vs alternative treatments vs no treatment, and potential side effects of these treatments (death, dependency, psychosis, tino, aggression, HTN, ticks, MI, stroke, arrythmia, seizure, anaphylaxis or other allergic reactions, leukopenia, nervousness, anorexia, insomnia, tachycardia, palpitations, dizziness, BP changes, HR changes, visual disturbance, etc.). The patient expresses understanding of the above and displays the capacity to agree with this treatment given said understanding. Patient also agrees  that, currently, the benefits outweigh the risks and would like to pursue treatment at this time.  Patient voices understanding and agreement with this plan  Encouraged patient to keep future appointments  Instruct patient to call or message with questions  In the event of an emergency, including suicidal ideation, patient was advised to go to the emergency room      Return to Clinic: 3 months    Sangita Shepard PA-C

## 2024-11-08 ENCOUNTER — OFFICE VISIT (OUTPATIENT)
Dept: PSYCHIATRY | Facility: CLINIC | Age: 29
End: 2024-11-08
Payer: COMMERCIAL

## 2024-11-08 DIAGNOSIS — F41.1 GENERALIZED ANXIETY DISORDER: Primary | ICD-10-CM

## 2024-11-08 DIAGNOSIS — F90.2 ADHD (ATTENTION DEFICIT HYPERACTIVITY DISORDER), COMBINED TYPE: ICD-10-CM

## 2024-11-08 RX ORDER — DEXTROAMPHETAMINE SACCHARATE, AMPHETAMINE ASPARTATE MONOHYDRATE, DEXTROAMPHETAMINE SULFATE AND AMPHETAMINE SULFATE 6.25; 6.25; 6.25; 6.25 MG/1; MG/1; MG/1; MG/1
25 CAPSULE, EXTENDED RELEASE ORAL EVERY MORNING
Qty: 30 CAPSULE | Refills: 0 | Status: SHIPPED | OUTPATIENT
Start: 2024-12-04

## 2024-11-08 RX ORDER — DEXTROAMPHETAMINE SACCHARATE, AMPHETAMINE ASPARTATE MONOHYDRATE, DEXTROAMPHETAMINE SULFATE AND AMPHETAMINE SULFATE 6.25; 6.25; 6.25; 6.25 MG/1; MG/1; MG/1; MG/1
25 CAPSULE, EXTENDED RELEASE ORAL EVERY MORNING
Qty: 30 CAPSULE | Refills: 0 | Status: SHIPPED | OUTPATIENT
Start: 2025-02-04

## 2024-11-08 RX ORDER — DEXTROAMPHETAMINE SACCHARATE, AMPHETAMINE ASPARTATE MONOHYDRATE, DEXTROAMPHETAMINE SULFATE AND AMPHETAMINE SULFATE 6.25; 6.25; 6.25; 6.25 MG/1; MG/1; MG/1; MG/1
25 CAPSULE, EXTENDED RELEASE ORAL EVERY MORNING
Qty: 30 CAPSULE | Refills: 0 | Status: SHIPPED | OUTPATIENT
Start: 2025-01-04

## 2024-11-21 ENCOUNTER — HOSPITAL ENCOUNTER (EMERGENCY)
Facility: HOSPITAL | Age: 29
Discharge: HOME | End: 2024-11-21
Attending: EMERGENCY MEDICINE
Payer: COMMERCIAL

## 2024-11-21 VITALS
RESPIRATION RATE: 17 BRPM | HEIGHT: 69 IN | HEART RATE: 81 BPM | BODY MASS INDEX: 26.66 KG/M2 | TEMPERATURE: 98 F | SYSTOLIC BLOOD PRESSURE: 132 MMHG | OXYGEN SATURATION: 100 % | DIASTOLIC BLOOD PRESSURE: 77 MMHG | WEIGHT: 180 LBS

## 2024-11-21 DIAGNOSIS — T19.2XXA FOREIGN BODY IN VAGINA, INITIAL ENCOUNTER: Primary | ICD-10-CM

## 2024-11-21 PROCEDURE — 99282 EMERGENCY DEPT VISIT SF MDM: CPT

## 2024-11-22 NOTE — ED PROVIDER NOTES
Source of History:  Patient  Chart    Chief complaint:  Foreign Body in Vagina (Tampon got stuck earlier this evening)      HPI:  Milka Smith is a 29 y.o. female presenting to emergency department with complaint of vaginal foreign body.      Patient states that she placed a tampon in her vagina earlier today and when she tried to remove it shortly prior to arrival, the string broke off and she could not get it out.  She has no pain, discharge, bleeding, or other complaints.    Review of patient's allergies indicates:  No Known Allergies    No current facility-administered medications on file prior to encounter.     Current Outpatient Medications on File Prior to Encounter   Medication Sig Dispense Refill    [START ON 2/4/2025] dextroamphetamine-amphetamine (ADDERALL XR) 25 MG 24 hr capsule Take 1 capsule (25 mg total) by mouth every morning. 30 capsule 0    [START ON 1/4/2025] dextroamphetamine-amphetamine (ADDERALL XR) 25 MG 24 hr capsule Take 1 capsule (25 mg total) by mouth every morning. 30 capsule 0    [START ON 12/4/2024] dextroamphetamine-amphetamine (ADDERALL XR) 25 MG 24 hr capsule Take 1 capsule (25 mg total) by mouth every morning. 30 capsule 0    EScitalopram oxalate (LEXAPRO) 10 MG tablet Take 1 tablet (10 mg total) by mouth once daily. 90 tablet 0       PMH:  As per HPI and below:  Past Medical History:   Diagnosis Date    Depression     Overweight (BMI 25.0-29.9)      Past Surgical History:   Procedure Laterality Date    NO PAST SURGERIES         Social History     Socioeconomic History    Marital status: Single    Number of children: 0   Occupational History    Occupation: student   Tobacco Use    Smoking status: Every Day     Types: Vaping w/o nicotine    Smokeless tobacco: Never   Substance and Sexual Activity    Alcohol use: Yes     Comment: Socially drinks beer    Drug use: No    Sexual activity: Never   Social History Narrative    Currently at [employer].     Social Drivers of  Health     Financial Resource Strain: Low Risk  (5/21/2024)    Overall Financial Resource Strain (CARDIA)     Difficulty of Paying Living Expenses: Not hard at all   Food Insecurity: Food Insecurity Present (5/21/2024)    Hunger Vital Sign     Worried About Running Out of Food in the Last Year: Sometimes true     Ran Out of Food in the Last Year: Sometimes true   Physical Activity: Sufficiently Active (5/21/2024)    Exercise Vital Sign     Days of Exercise per Week: 5 days     Minutes of Exercise per Session: 30 min   Stress: No Stress Concern Present (5/21/2024)    [employer] - Occupational Stress Questionnaire     Feeling of Stress : Not at all   Housing Stability: High Risk (5/21/2024)    Housing Stability Vital Sign     Unable to Pay for Housing in the Last Year: Yes       Family History   Problem Relation Name Age of Onset    Colon cancer Father  42    Hypertension Father      Diabetes Paternal Grandmother      No Known Problems Mother      Deep vein thrombosis Maternal Grandmother      Cancer Maternal Grandmother      Heart disease Paternal Grandfather      Hypertension Paternal Grandfather      Breast cancer Unknown          maternal great aunt       Physical Exam:      Vitals:    11/21/24 1950   BP: 132/77   Pulse: 81   Resp: 17   Temp: 97.7 °F (36.5 °C)     Gen: No acute distress.  Nontoxic.  Well appearing.  Mental Status:  Alert.  Appropriate, conversant.  Skin: Warm, dry. No rashes seen.  Eyes: No conjunctival injection.  Pulm: No increased work of breathing.  No significant tachypnea.  No audible stridor or wheezing.  No conversational dyspnea.    CV: Regular rate. Regular rhythm.   :  Normal external genitalia.  Tampon present inside the vagina, no discharge present.  Scant blood present..    Neuro: Awake. Speech normal. No focal neuro deficit observed.      Laboratory Studies:  Labs Reviewed   HEPATITIS C ANTIBODY   HIV 1 / 2 ANTIBODY       Chart reviewed.    Imaging  Results    None         Medications Given:  Medications - No data to display    MDM:    29 y.o. female with foreign body in vagina.  Please see procedure note below, foreign body successfully removed.  Discharged in stable condition    Foreign Body    Date/Time: 11/21/2024 9:50 PM    Performed by: Sophie Ferrer MD  Authorized by: Sophie Ferrer MD  Consent Done: Yes  Consent: Verbal consent obtained.  Body area: vagina    Patient sedated: no  Patient restrained: no  Patient cooperative: yes  Localization method: visualized and speculum  Removal mechanism: forceps  Complexity: simple  1 objects recovered.  Objects recovered: tampon  Post-procedure assessment: foreign body removed  Patient tolerance: Patient tolerated the procedure well with no immediate complications          Diagnostic Impression:    1. Foreign body in vagina, initial encounter         ED Disposition Condition    Discharge Stable          ED Prescriptions    None       Follow-up Information       Follow up With Specialties Details Why Contact Info    Cuate Parker MD Family Medicine Schedule an appointment as soon as possible for a visit   [address] [facility] [id number] [phone]              Patient understands the plan and is in agreement, verbalized understanding, questions answered    Sophie Ferrer MD  Emergency Medicine         Sophie Ferrer MD  11/21/24 [id number]

## 2024-11-22 NOTE — ED TRIAGE NOTES
Milka Smith, a 29 y.o. female presents to the ED w/ complaint of foreign body in vaginal canal. Patient states that around 2pm while she was attempting to change tampon the string broke and was unable to remove that tampon.     Triage note:  Chief Complaint   Patient presents with    Foreign Body in Vagina     Tampon got stuck earlier this evening     Review of patient's allergies indicates:  No Known Allergies  Past Medical History:   Diagnosis Date    Depression     Overweight (BMI 25.0-29.9)

## 2024-12-14 DIAGNOSIS — F41.1 GENERALIZED ANXIETY DISORDER: ICD-10-CM

## 2024-12-16 ENCOUNTER — PATIENT MESSAGE (OUTPATIENT)
Dept: PSYCHIATRY | Facility: CLINIC | Age: 29
End: 2024-12-16
Payer: COMMERCIAL

## 2024-12-16 RX ORDER — ESCITALOPRAM OXALATE 10 MG/1
10 TABLET ORAL DAILY
Qty: 90 TABLET | Refills: 0 | Status: SHIPPED | OUTPATIENT
Start: 2024-12-16 | End: 2025-03-16

## 2025-02-25 NOTE — PROGRESS NOTES
"Outpatient Psychiatry Follow-Up Visit (PA)    02/25/2025    Clinical Status of Patient:  Outpatient (Ambulatory)    Chief Complaint:  Milka Smith is a 29 y.o. female who presents today for follow-up of anxiety.  Met with patient.     Current Medications:   Lexapro 10 mg  Adderall XR 25 mg  Propanolol 10 mg BID    Interval History and Content of Current Session:  Patient seen and chart reviewed. Last seen on 11/8/2024    Patient is 24 min late to 30 min appt    Patient has a psychiatric history of: MDD, BRET, ADHD    Patient presents for follow up today stating she's been doing well.     She reports her anxiety has been "good." She continues to endorse some situational anxiety, states "I'm doing fine overall."    She reports her mood has been "stable", reports decreased agitation and irritability.   She denies any depression, "still a positive person", but states she has noticed lower energy, lower motivation, endorses some "blah" mood.     She reports she has been struggling with low libido and would like to address.     She reports Adderall continues to work well, helping with motivation, task completion     She is wanting to address addition of seroquel, states she was started on while in inpatient rehab, is interested in restarting. She states she felt better when taking, felt happier and more energetic.     She is sleeping "really well" but states she is tired "no matter how much I sleep."  Appetite is good. Patient has gained close to 40 lbs in the last year. She reports this occurred after her inpatient rehab, reports her appetite improved afterwards. She is not bothered by weight gain.     No SI/HI/self harm      Psychotherapy:  Target symptoms: depression, distractability, lack of focus  Why chosen therapy is appropriate versus another modality: relevant to diagnosis, evidence based practice  Outcome monitoring methods: self-report  Therapeutic intervention type: insight oriented psychotherapy, " "supportive psychotherapy  Topics discussed/themes: symptom recognition, substance abuse, resolved  The patient's response to the intervention is accepting. The patient's progress toward treatment goals is good.   Duration of intervention: 10 minutes.    Review of Systems   PSYCHIATRIC: Pertinant items are noted in the narrative.    Past Medication Trials:  "SSRIs"  Adderall- worked well, shortage  Vyvanse- worked well    Past Medical, Family and Social History: The patient's past medical, family and social history have been reviewed and updated as appropriate within the electronic medical record - see encounter notes.  Patient cleared by cardiology to take ADHD stimulant, lexapro    Compliance: yes    Side effects: decreased libido    Risk Parameters:  Patient reports no suicidal ideation  Patient reports no homicidal ideation  Patient reports no self-injurious behavior  Patient reports no violent behavior    Exam (detailed: at least 9 elements; comprehensive: all 15 elements)   Constitutional  Vitals:  Most recent vital signs, dated greater than 90 days prior to this appointment, were reviewed.   There were no vitals filed for this visit.             General:  unremarkable, age appropriate, casually dressed, neatly groomed     Musculoskeletal  Muscle Strength/Tone:  not examined   Gait & Station:  non-ataxic     Psychiatric  Speech:  no latency; no press   Mood & Affect:  steady  congruent and appropriate   Thought Process:  normal and logical   Associations:  intact   Thought Content:  normal, no suicidality, no homicidality, delusions, or paranoia   Insight:  intact   Judgement: behavior is adequate to circumstances   Orientation:  grossly intact   Memory: intact for content of interview   Language: grossly intact   Attention Span & Concentration:  able to focus   Fund of Knowledge:  intact and appropriate to age and level of education     Assessment and Diagnosis   Status/Progress: Based on the examination " today, the patient's problem(s) is/are improved and adequately but not ideally controlled.  New problems have been presented today.   Lack of compliance are not complicating management of the primary condition.  There are no active rule-out diagnoses for this patient at this time.     General Impression: Patient is a 29 year old female with a psychiatric history of anxiety and ADHD. Patient reports significant improvement in her symptoms since starting lexapro 10 mg,  Adderall XR 25 mg, and propanolol 10 mg BID. Today, she reports some lower mood, feeling less happy than before, and is wanting to address decreased libido. Will trial wellbutrin xl 150 mg to address libido, improve mood/motivation     no SI/HI/self harm        ICD-10-CM ICD-9-CM    1. ADHD (attention deficit hyperactivity disorder), combined type  F90.2 314.01 dextroamphetamine-amphetamine (ADDERALL XR) 25 MG 24 hr capsule      dextroamphetamine-amphetamine (ADDERALL XR) 25 MG 24 hr capsule      2. Generalized anxiety disorder  F41.1 300.02 EScitalopram oxalate (LEXAPRO) 10 MG tablet                Intervention/Counseling/Treatment Plan   Medication Management: The risks and benefits of medication were discussed with the patient.   Start wellbutrin xl 150 mg daily for mood, sexual ASE  Continue lexapro 10 mg  Continue Adderall XR 25 mg  Continue propanolol 10 mg BID per PCP  Continue sublocade per outside provider  Discussed diagnosis, risk and benefits of proposed treatment above vs alternative treatment vs no treatment, and potential side effects of these treatments, and the inherent unpredictability of individual responses to these treatments. The patient expresses understanding and gives informed consent to pursue treatment at this time, believing that the potential benefits outweigh the potential risks. Patient has no other questions.   Patient has no contraindications: no h/o allergic rxn, agitation, anxiety, tourette's, arrythmia,  cardiovascular disease, cardiac structural abnormalities, hyperthyroidism, glaucoma, Other psychiatric illness, etc.   Discussed diagnosis, risks and benefits of proposed treatment vs alternative treatments vs no treatment, and potential side effects of these treatments (death, dependency, psychosis, tino, aggression, HTN, ticks, MI, stroke, arrythmia, seizure, anaphylaxis or other allergic reactions, leukopenia, nervousness, anorexia, insomnia, tachycardia, palpitations, dizziness, BP changes, HR changes, visual disturbance, etc.). The patient expresses understanding of the above and displays the capacity to agree with this treatment given said understanding. Patient also agrees that, currently, the benefits outweigh the risks and would like to pursue treatment at this time.  Patient voices understanding and agreement with this plan  Encouraged patient to keep future appointments  Instruct patient to call or message with questions  In the event of an emergency, including suicidal ideation, patient was advised to go to the emergency room      Return to Clinic: 3 months    Sangita Shepard PA-C

## 2025-02-26 ENCOUNTER — OFFICE VISIT (OUTPATIENT)
Dept: PSYCHIATRY | Facility: CLINIC | Age: 30
End: 2025-02-26
Payer: COMMERCIAL

## 2025-02-26 DIAGNOSIS — F41.1 GENERALIZED ANXIETY DISORDER: ICD-10-CM

## 2025-02-26 DIAGNOSIS — F90.2 ADHD (ATTENTION DEFICIT HYPERACTIVITY DISORDER), COMBINED TYPE: ICD-10-CM

## 2025-02-26 PROCEDURE — 99999 PR PBB SHADOW E&M-EST. PATIENT-LVL II: CPT | Mod: PBBFAC,,, | Performed by: PHYSICIAN ASSISTANT

## 2025-02-26 PROCEDURE — 1160F RVW MEDS BY RX/DR IN RCRD: CPT | Mod: CPTII,S$GLB,, | Performed by: PHYSICIAN ASSISTANT

## 2025-02-26 PROCEDURE — 99214 OFFICE O/P EST MOD 30 MIN: CPT | Mod: S$GLB,,, | Performed by: PHYSICIAN ASSISTANT

## 2025-02-26 PROCEDURE — 1159F MED LIST DOCD IN RCRD: CPT | Mod: CPTII,S$GLB,, | Performed by: PHYSICIAN ASSISTANT

## 2025-02-26 RX ORDER — BUPROPION HYDROCHLORIDE 150 MG/1
150 TABLET ORAL DAILY
Qty: 30 TABLET | Refills: 2 | Status: SHIPPED | OUTPATIENT
Start: 2025-02-26 | End: 2025-05-27

## 2025-02-26 RX ORDER — DEXTROAMPHETAMINE SACCHARATE, AMPHETAMINE ASPARTATE MONOHYDRATE, DEXTROAMPHETAMINE SULFATE AND AMPHETAMINE SULFATE 6.25; 6.25; 6.25; 6.25 MG/1; MG/1; MG/1; MG/1
25 CAPSULE, EXTENDED RELEASE ORAL EVERY MORNING
Qty: 30 CAPSULE | Refills: 0 | Status: SHIPPED | OUTPATIENT
Start: 2025-04-04

## 2025-02-26 RX ORDER — DEXTROAMPHETAMINE SACCHARATE, AMPHETAMINE ASPARTATE MONOHYDRATE, DEXTROAMPHETAMINE SULFATE AND AMPHETAMINE SULFATE 6.25; 6.25; 6.25; 6.25 MG/1; MG/1; MG/1; MG/1
25 CAPSULE, EXTENDED RELEASE ORAL EVERY MORNING
Qty: 30 CAPSULE | Refills: 0 | Status: SHIPPED | OUTPATIENT
Start: 2025-03-04

## 2025-02-26 RX ORDER — ESCITALOPRAM OXALATE 10 MG/1
10 TABLET ORAL DAILY
Qty: 90 TABLET | Refills: 0 | Status: SHIPPED | OUTPATIENT
Start: 2025-02-26 | End: 2025-05-27

## 2025-03-10 ENCOUNTER — HOSPITAL ENCOUNTER (EMERGENCY)
Facility: HOSPITAL | Age: 30
Discharge: HOME | End: 2025-03-10
Attending: EMERGENCY MEDICINE
Payer: COMMERCIAL

## 2025-03-10 VITALS
RESPIRATION RATE: 16 BRPM | OXYGEN SATURATION: 100 % | HEART RATE: 66 BPM | TEMPERATURE: 99 F | BODY MASS INDEX: 28.14 KG/M2 | DIASTOLIC BLOOD PRESSURE: 82 MMHG | HEIGHT: 69 IN | WEIGHT: 190 LBS | SYSTOLIC BLOOD PRESSURE: 130 MMHG

## 2025-03-10 DIAGNOSIS — H81.10 BENIGN PAROXYSMAL POSITIONAL VERTIGO, UNSPECIFIED LATERALITY: Primary | ICD-10-CM

## 2025-03-10 LAB
ABO + RH BLD: NORMAL
ALBUMIN SERPL BCP-MCNC: 3.4 G/DL (ref 3.5–5.2)
ALP SERPL-CCNC: 86 U/L (ref 40–150)
ALT SERPL W/O P-5'-P-CCNC: 25 U/L (ref 10–44)
ANION GAP SERPL CALC-SCNC: 6 MMOL/L (ref 8–16)
AST SERPL-CCNC: 33 U/L (ref 10–40)
BASOPHILS # BLD AUTO: 0.04 K/UL (ref 0–0.2)
BASOPHILS NFR BLD: 1.1 % (ref 0–1.9)
BILIRUB SERPL-MCNC: 0.3 MG/DL (ref 0.1–1)
BILIRUB UR QL STRIP: NEGATIVE
BUN SERPL-MCNC: 13 MG/DL (ref 6–20)
CALCIUM SERPL-MCNC: 8.9 MG/DL (ref 8.7–10.5)
CHLORIDE SERPL-SCNC: 102 MMOL/L (ref 95–110)
CLARITY UR REFRACT.AUTO: CLEAR
CO2 SERPL-SCNC: 25 MMOL/L (ref 23–29)
COLOR UR AUTO: YELLOW
CREAT SERPL-MCNC: 0.8 MG/DL (ref 0.5–1.4)
DIFFERENTIAL METHOD BLD: ABNORMAL
EOSINOPHIL # BLD AUTO: 0.1 K/UL (ref 0–0.5)
EOSINOPHIL NFR BLD: 3.8 % (ref 0–8)
ERYTHROCYTE [DISTWIDTH] IN BLOOD BY AUTOMATED COUNT: 14 % (ref 11.5–14.5)
EST. GFR  (NO RACE VARIABLE): >60 ML/MIN/1.73 M^2
GLUCOSE SERPL-MCNC: 84 MG/DL (ref 70–110)
GLUCOSE UR QL STRIP: NEGATIVE
HCG INTACT+B SERPL-ACNC: <2.4 MIU/ML
HCT VFR BLD AUTO: 40.3 % (ref 37–48.5)
HCV AB SERPL QL IA: NORMAL
HGB BLD-MCNC: 13 G/DL (ref 12–16)
HGB UR QL STRIP: NEGATIVE
HIV 1+2 AB+HIV1 P24 AG SERPL QL IA: NORMAL
IMM GRANULOCYTES # BLD AUTO: 0.01 K/UL (ref 0–0.04)
IMM GRANULOCYTES NFR BLD AUTO: 0.3 % (ref 0–0.5)
KETONES UR QL STRIP: NEGATIVE
LEUKOCYTE ESTERASE UR QL STRIP: NEGATIVE
LYMPHOCYTES # BLD AUTO: 1 K/UL (ref 1–4.8)
LYMPHOCYTES NFR BLD: 27.9 % (ref 18–48)
MCH RBC QN AUTO: 30.3 PG (ref 27–31)
MCHC RBC AUTO-ENTMCNC: 32.3 G/DL (ref 32–36)
MCV RBC AUTO: 94 FL (ref 82–98)
MONOCYTES # BLD AUTO: 0.5 K/UL (ref 0.3–1)
MONOCYTES NFR BLD: 14.2 % (ref 4–15)
NEUTROPHILS # BLD AUTO: 1.9 K/UL (ref 1.8–7.7)
NEUTROPHILS NFR BLD: 52.7 % (ref 38–73)
NITRITE UR QL STRIP: NEGATIVE
NRBC BLD-RTO: 0 /100 WBC
OHS QRS DURATION: 72 MS
OHS QTC CALCULATION: 444 MS
PH UR STRIP: 7 [PH] (ref 5–8)
PLATELET # BLD AUTO: 280 K/UL (ref 150–450)
PMV BLD AUTO: 10.1 FL (ref 9.2–12.9)
POTASSIUM SERPL-SCNC: 4.4 MMOL/L (ref 3.5–5.1)
PROT SERPL-MCNC: 7.2 G/DL (ref 6–8.4)
PROT UR QL STRIP: ABNORMAL
RBC # BLD AUTO: 4.29 M/UL (ref 4–5.4)
SODIUM SERPL-SCNC: 133 MMOL/L (ref 136–145)
SP GR UR STRIP: 1.03 (ref 1–1.03)
URN SPEC COLLECT METH UR: ABNORMAL
WBC # BLD AUTO: 3.66 K/UL (ref 3.9–12.7)

## 2025-03-10 PROCEDURE — 25000003 PHARM REV CODE 250: Performed by: EMERGENCY MEDICINE

## 2025-03-10 PROCEDURE — 87389 HIV-1 AG W/HIV-1&-2 AB AG IA: CPT | Performed by: PHYSICIAN ASSISTANT

## 2025-03-10 PROCEDURE — 85025 COMPLETE CBC W/AUTO DIFF WBC: CPT | Performed by: EMERGENCY MEDICINE

## 2025-03-10 PROCEDURE — 96360 HYDRATION IV INFUSION INIT: CPT

## 2025-03-10 PROCEDURE — 84702 CHORIONIC GONADOTROPIN TEST: CPT | Performed by: EMERGENCY MEDICINE

## 2025-03-10 PROCEDURE — 99284 EMERGENCY DEPT VISIT MOD MDM: CPT | Mod: 25

## 2025-03-10 PROCEDURE — 86803 HEPATITIS C AB TEST: CPT | Performed by: PHYSICIAN ASSISTANT

## 2025-03-10 PROCEDURE — 86901 BLOOD TYPING SEROLOGIC RH(D): CPT | Performed by: EMERGENCY MEDICINE

## 2025-03-10 PROCEDURE — 80053 COMPREHEN METABOLIC PANEL: CPT | Performed by: EMERGENCY MEDICINE

## 2025-03-10 PROCEDURE — 81003 URINALYSIS AUTO W/O SCOPE: CPT | Performed by: EMERGENCY MEDICINE

## 2025-03-10 RX ORDER — MECLIZINE HCL 12.5 MG 12.5 MG/1
50 TABLET ORAL
Status: DISCONTINUED | OUTPATIENT
Start: 2025-03-10 | End: 2025-03-10

## 2025-03-10 RX ORDER — MECLIZINE HCL 12.5 MG 12.5 MG/1
50 TABLET ORAL
Status: COMPLETED | OUTPATIENT
Start: 2025-03-10 | End: 2025-03-10

## 2025-03-10 RX ORDER — MECLIZINE HYDROCHLORIDE 25 MG/1
25 TABLET ORAL 3 TIMES DAILY PRN
Qty: 20 TABLET | Refills: 0 | Status: SHIPPED | OUTPATIENT
Start: 2025-03-10

## 2025-03-10 RX ADMIN — SODIUM CHLORIDE 1000 ML: 9 INJECTION, SOLUTION INTRAVENOUS at 01:03

## 2025-03-10 RX ADMIN — MECLIZINE HYDROCHLORIDE 50 MG: 12.5 TABLET ORAL at 01:03

## 2025-03-10 NOTE — ED NOTES
Patient identifiers verified and correct for Milka Smith   LOC: The patient is aao x4  APPEARANCE: Patient appears comfortable and in no acute distress, patient is clean and well groomed.  SKIN: The skin is warm and dry, color consistent with ethnicity, patient has normal skin turgor and moist mucus membranes, skin intact  MUSCULOSKELETAL: Patient moving all extremities spontaneously, no swelling noted.  RESPIRATORY: Airway is open and patent, respirations are spontaneous, patient has a normal effort and rate, no accessory muscle use noted,  O2 sats noted at 100% on room air.  CARDIAC: Pt placed on cardiac monitor. Patient has a normal rate and regular rhythm, no edema noted, capillary refill < 3 seconds.   GASTRO: Soft and non tender to palpation, no distention noted  : Pt denies any pain or frequency with urination.  NEURO: Pt opens eyes spontaneously, behavior appropriate to situation, follows commands, facial expression symmetrical, bilateral hand grasp equal and even, purposeful motor response noted, normal sensation in all extremities when touched with a finger. +dizziness

## 2025-03-10 NOTE — ED PROVIDER NOTES
Encounter Date: 3/10/2025       History     Chief Complaint   Patient presents with    Dizziness     Plan B 2 weeks ago and feeling dizzy since     HPI  29-year-old female with past medical history as noted below, coming in with intermittent dizziness since Friday.  She says that whenever she moves her head she has intense spinning that last 2-3 minutes and then resolves.  When she does not move she has no spinning and does not feel dizzy although safe sometimes feel like her vision in his still slightly off.  No other associated neurologic symptoms such as numbness, weakness, trouble speaking.  She does say it is sometimes difficult to walk although she has not had any falls.  Associated pain complaints there is no chest pain, abdominal pain, headache or other pain.  She denies any recent illnesses with fevers, cough, vomiting or diarrhea.  She denies any shortness of breath.    No dysuria or frequency.  No abnormal vaginal discharge.    Does state that she took plan B about 2 weeks ago and feels like she might have had some dizziness after that.      Review of patient's allergies indicates:  No Known Allergies  Past Medical History:   Diagnosis Date    Depression     Overweight (BMI 25.0-29.9)      Past Surgical History:   Procedure Laterality Date    NO PAST SURGERIES       Family History   Problem Relation Name Age of Onset    Colon cancer Father  42    Hypertension Father      Diabetes Paternal Grandmother      No Known Problems Mother      Deep vein thrombosis Maternal Grandmother      Cancer Maternal Grandmother      Heart disease Paternal Grandfather      Hypertension Paternal Grandfather      Breast cancer Unknown          maternal great aunt     Social History[1]  Review of Systems    Physical Exam     Initial Vitals [03/10/25 1117]   BP Pulse Resp Temp SpO2   139/77 93 18 98.8 °F (37.1 °C) 100 %      MAP       --         Physical Exam    Physical Exam:  CONSTITUTIONAL: Well developed, well nourished, in  no acute distress.  HENT: Normocephalic, atraumatic    EYES: Sclerae anicteric, pupils equal round reactive, extraocular is intact, no nystagmus.  \  NECK: Supple, no thyroid enlargement  CARDIOVASCULAR: Regular rate and rhythm without any murmurs, gallops, rubs.  RESPIRATORY: Speaking in full sentences. Breathing comfortably. Auscultation of the lungs revealed normal breath sounds b/l, no wheezing, no rales, no rhonchi.  ABDOMEN: Soft and nontender, no masses, no rebound or guarding   NEUROLOGIC: Alert, interacting normally. No facial droop. Voice is clear. Speech is fluent.  5/5 strength bilaterally upper and lower extremities, normal sensation to light touch bilaterally upper and lower extremities, normal finger-to-nose bilaterally.  Normal stable gait.  MSK: Moving all four extremities.  SKIN: Warm and dry. No visible rash on exposed areas of skin.    Psych: Mood and affect normal.       ED Course   Procedures  Labs Reviewed   CBC W/ AUTO DIFFERENTIAL - Abnormal       Result Value    WBC 3.66 (*)     RBC 4.29      Hemoglobin 13.0      Hematocrit 40.3      MCV 94      MCH 30.3      MCHC 32.3      RDW 14.0      Platelets 280      MPV 10.1      Immature Granulocytes 0.3      Gran # (ANC) 1.9      Immature Grans (Abs) 0.01      Lymph # 1.0      Mono # 0.5      Eos # 0.1      Baso # 0.04      nRBC 0      Gran % 52.7      Lymph % 27.9      Mono % 14.2      Eosinophil % 3.8      Basophil % 1.1      Differential Method Automated      Narrative:     Release to patient->Immediate   COMPREHENSIVE METABOLIC PANEL - Abnormal    Sodium 133 (*)     Potassium 4.4      Chloride 102      CO2 25      Glucose 84      BUN 13      Creatinine 0.8      Calcium 8.9      Total Protein 7.2      Albumin 3.4 (*)     Total Bilirubin 0.3      Alkaline Phosphatase 86      AST 33      ALT 25      eGFR >60.0      Anion Gap 6 (*)     Narrative:     Release to patient->Immediate   URINALYSIS, REFLEX TO URINE CULTURE - Abnormal    Specimen UA  Urine, Clean Catch      Color, UA Yellow      Appearance, UA Clear      pH, UA 7.0      Specific Gravity, UA 1.030      Protein, UA Trace (*)     Glucose, UA Negative      Ketones, UA Negative      Bilirubin (UA) Negative      Occult Blood UA Negative      Nitrite, UA Negative      Leukocytes, UA Negative      Narrative:     Specimen Source->Urine   HCG, QUANTITATIVE    HCG Quant <2.4      Narrative:     Release to patient->Immediate   HEPATITIS C ANTIBODY    Hepatitis C Ab Non-reactive      Narrative:     Release to patient->Immediate   HIV 1 / 2 ANTIBODY    HIV 1/2 Ag/Ab Non-reactive      Narrative:     Release to patient->Immediate   GROUP & RH    Group & Rh O POS            Imaging Results    None          Medications   sodium chloride 0.9% bolus 1,000 mL 1,000 mL (1,000 mLs Intravenous New Bag 3/10/25 1326)   meclizine tablet 50 mg (50 mg Oral Given 3/10/25 1330)     Medical Decision Making  Amount and/or Complexity of Data Reviewed  External Data Reviewed: notes.     Details: Urgent care note reviewed.  Labs: ordered.      29-year-old female with past history as noted coming in with intermittent room spinning triggered with head movements resolves after a few minutes.   Exam as noted above.      History and exam is consistent with benign peripheral vertigo, no red flag symptoms or findings on exam for stroke or dangerous intracranial pathology at this time.  At this time risk of brain imaging outweighs benefits.    Given this history of plan B usage will still check UPT, check for any metabolic hematologic abnormalities explaining her symptoms although I have a low suspicion for this.    Will treat symptomatically with meclizine as well as IV fluids.    No indication for pelvic exam as she has no pelvic symptoms no abdominal pain no abdominal cramping no dysuria no abnormal vaginal discharge.    Anticipate likely discharge with supportive care, outpatient follow-up and ED return  precautions.    Update:  Patient remains well-appearing and hemodynamically stable in the emergency department.    Labs are unremarkable, urine is unremarkable.  Patient reports her symptoms are significantly improved.      At this time not consistent acutely dangerous pathology.  Strong suspicion for BPPV.     Will discharge with meclizine and Epley maneuver instructions.  Outpatient follow up with primary care doctor for any continued minor symptoms, ED return precautions.    Findings of ED work up and return precautions verbally explained to patient. Patient agrees with discharge plan, return instructions and verbalizes understanding of return precautions.                                       Clinical Impression:  Final diagnoses:  [H81.10] Benign paroxysmal positional vertigo, unspecified laterality (Primary)          ED Disposition Condition    Discharge Stable          ED Prescriptions       Medication Sig Dispense Start Date End Date Auth. Provider    meclizine (ANTIVERT) 25 mg tablet Take 1 tablet (25 mg total) by mouth 3 (three) times daily as needed for Dizziness. 20 tablet 3/10/2025 -- Erasmo See MD          Follow-up Information       Follow up With Specialties Details Why Contact Info    Cutae Parker MD Family Medicine In 1 week  [address] [place] [id number]  [phone]                   [1]   Social History  Tobacco Use    Smoking status: Every Day     Types: Vaping w/o nicotine    Smokeless tobacco: Never   Substance Use Topics    Alcohol use: Yes     Comment: Socially drinks beer    Drug use: No        Erasmo See MD  03/10/25 [id number]

## 2025-03-10 NOTE — FIRST PROVIDER EVALUATION
Medical screening examination initiated.  I have conducted a focused provider triage encounter, findings are as follows:    Brief history of present illness:  30 yo F w/ dizzyness and intermittent vomiting. Took Plan B 2 weeks and has had progressive symptoms since. Referred from     There were no vitals filed for this visit.    Pertinent physical exam:  nontoxic and well appearing    Brief workup plan:  1st Trimester OB order set. PO meclizine. ECG    Preliminary workup initiated; this workup will be continued and followed by the physician or advanced practice provider that is assigned to the patient when roomed.

## 2025-03-10 NOTE — DISCHARGE INSTRUCTIONS
Your labs, exam, urine studies did not show any signs of dangerous conditions.    You have likely benign positional vertigo.  This improves with time and vestibular exercises.  You can also take meclizine for your symptoms.    If you develop any new, worsening worrisome symptoms please return to emergency department.    For any continued minor symptoms please follow-up with your pregnancy care doctor within 1 week.

## 2025-03-10 NOTE — Clinical Note
"Milka"Isabelle Smith was seen and treated in our emergency department on 3/10/2025.  She may return to work on 03/12/2025.       If you have any questions or concerns, please don't hesitate to call.      Megha Frankel RN RN    "

## 2025-03-10 NOTE — ED TRIAGE NOTES
Pt arriving to ED c/o constant dizziness since Friday. +N/V, denies abd pain, denies numbness/tingling. Took benadryl, dramamine, and zofran with no relief. Took a plan B and has been having the dizziness since. Last menstrual cycle 2/1. Hx vertigo, states this feels different

## 2025-05-06 ENCOUNTER — OFFICE VISIT (OUTPATIENT)
Dept: PSYCHIATRY | Facility: CLINIC | Age: 30
End: 2025-05-06
Payer: COMMERCIAL

## 2025-05-06 VITALS
DIASTOLIC BLOOD PRESSURE: 85 MMHG | SYSTOLIC BLOOD PRESSURE: 122 MMHG | BODY MASS INDEX: 29.76 KG/M2 | WEIGHT: 201.5 LBS | HEART RATE: 121 BPM

## 2025-05-06 DIAGNOSIS — F90.2 ADHD (ATTENTION DEFICIT HYPERACTIVITY DISORDER), COMBINED TYPE: ICD-10-CM

## 2025-05-06 DIAGNOSIS — F41.1 GENERALIZED ANXIETY DISORDER: ICD-10-CM

## 2025-05-06 PROCEDURE — 3074F SYST BP LT 130 MM HG: CPT | Mod: CPTII,S$GLB,, | Performed by: PHYSICIAN ASSISTANT

## 2025-05-06 PROCEDURE — 3008F BODY MASS INDEX DOCD: CPT | Mod: CPTII,S$GLB,, | Performed by: PHYSICIAN ASSISTANT

## 2025-05-06 PROCEDURE — 1159F MED LIST DOCD IN RCRD: CPT | Mod: CPTII,S$GLB,, | Performed by: PHYSICIAN ASSISTANT

## 2025-05-06 PROCEDURE — 3079F DIAST BP 80-89 MM HG: CPT | Mod: CPTII,S$GLB,, | Performed by: PHYSICIAN ASSISTANT

## 2025-05-06 PROCEDURE — 1160F RVW MEDS BY RX/DR IN RCRD: CPT | Mod: CPTII,S$GLB,, | Performed by: PHYSICIAN ASSISTANT

## 2025-05-06 PROCEDURE — 99999 PR PBB SHADOW E&M-EST. PATIENT-LVL III: CPT | Mod: PBBFAC,,, | Performed by: PHYSICIAN ASSISTANT

## 2025-05-06 PROCEDURE — 99214 OFFICE O/P EST MOD 30 MIN: CPT | Mod: S$GLB,,, | Performed by: PHYSICIAN ASSISTANT

## 2025-05-06 RX ORDER — BUPROPION HYDROCHLORIDE 150 MG/1
150 TABLET ORAL DAILY
Qty: 90 TABLET | Refills: 0 | Status: SHIPPED | OUTPATIENT
Start: 2025-05-06 | End: 2025-08-04

## 2025-05-06 RX ORDER — ESCITALOPRAM OXALATE 10 MG/1
10 TABLET ORAL DAILY
Qty: 90 TABLET | Refills: 0 | Status: SHIPPED | OUTPATIENT
Start: 2025-05-06 | End: 2025-08-04

## 2025-05-06 RX ORDER — DEXTROAMPHETAMINE SACCHARATE, AMPHETAMINE ASPARTATE MONOHYDRATE, DEXTROAMPHETAMINE SULFATE AND AMPHETAMINE SULFATE 6.25; 6.25; 6.25; 6.25 MG/1; MG/1; MG/1; MG/1
25 CAPSULE, EXTENDED RELEASE ORAL EVERY MORNING
Qty: 30 CAPSULE | Refills: 0 | Status: SHIPPED | OUTPATIENT
Start: 2025-05-06

## 2025-05-06 RX ORDER — DEXTROAMPHETAMINE SACCHARATE, AMPHETAMINE ASPARTATE MONOHYDRATE, DEXTROAMPHETAMINE SULFATE AND AMPHETAMINE SULFATE 6.25; 6.25; 6.25; 6.25 MG/1; MG/1; MG/1; MG/1
25 CAPSULE, EXTENDED RELEASE ORAL EVERY MORNING
Qty: 30 CAPSULE | Refills: 0 | Status: SHIPPED | OUTPATIENT
Start: 2025-06-06

## 2025-05-06 RX ORDER — DEXTROAMPHETAMINE SACCHARATE, AMPHETAMINE ASPARTATE MONOHYDRATE, DEXTROAMPHETAMINE SULFATE AND AMPHETAMINE SULFATE 6.25; 6.25; 6.25; 6.25 MG/1; MG/1; MG/1; MG/1
25 CAPSULE, EXTENDED RELEASE ORAL EVERY MORNING
Qty: 30 CAPSULE | Refills: 0 | Status: SHIPPED | OUTPATIENT
Start: 2025-07-06

## 2025-05-06 NOTE — PROGRESS NOTES
"Outpatient Psychiatry Follow-Up Visit (PA)    05/06/2025    Clinical Status of Patient:  Outpatient (Ambulatory)    Chief Complaint:  Milka Smith is a 29 y.o. female who presents today for follow-up of anxiety.  Met with patient.     Current Medications:   Lexapro 10 mg  Wellbutrin  mg  Adderall XR 25 mg  Propanolol 10 mg BID    Interval History and Content of Current Session:  Patient seen and chart reviewed. Last seen on 2/26/2025    Pt presents to follow up today after starting wellbutrin xl 150 mg,    Patient endorses benefit with  wellbutrin, "I like it.... I feel normal."  She reports her mood has been "good", with improvement in energy, motivation in morning, decreased tension, irritability, stress.     She denies any depression.   She denies any anxiety.    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   She has been able to manage this stress well.     She continues to endorse benefit with Adderall XR 25 mg, helping with focus, attention, states "I just feel normal"    She denies any ASE from any medications; denies tachycardia, palpitations, or increased anxiety.      She is sleeping "very well" and appetite remains good.     No SI/HI/self harm    She remains receiving sublocade injections, but with decreased frequency. She denies any cravings.     Pulse is higher today. Will continue to monitor.     BP is WNL. Pulse is high today. Will continue to monitor      Psychotherapy:  Target symptoms: depression, distractability, lack of focus  Why chosen therapy is appropriate versus another modality: relevant to diagnosis, evidence based practice  Outcome monitoring methods: self-report, observation  Therapeutic intervention type: supportive psychotherapy  Topics discussed/themes: symptom recognition,  The patient's response to the intervention is accepting. The patient's progress toward treatment goals is good.   Duration of intervention: 8 minutes.    Review of Systems " "  PSYCHIATRIC: Pertinant items are noted in the narrative.    Past Medication Trials:  "SSRIs"  Adderall- worked well, shortage  Vyvanse- worked well    Past Medical, Family and Social History: The patient's past medical, family and social history have been reviewed and updated as appropriate within the electronic medical record - see encounter notes.  Patient cleared by cardiology to take ADHD stimulant, lexapro    Compliance: yes    Side effects: None    Risk Parameters:  Patient reports no suicidal ideation  Patient reports no homicidal ideation  Patient reports no self-injurious behavior  Patient reports no violent behavior    Exam (detailed: at least 9 elements; comprehensive: all 15 elements)   Constitutional  Vitals:  Most recent vital signs, dated greater than 90 days prior to this appointment, were reviewed.   Vitals:    05/06/25 1437   BP: 122/85   Pulse: (!) 121   Weight: 91.4 kg (201 lb 8 oz)                General:  unremarkable, age appropriate, casually dressed, neatly groomed     Musculoskeletal  Muscle Strength/Tone:  not examined   Gait & Station:  non-ataxic     Psychiatric  Speech:  no latency; no press   Mood & Affect:  steady  congruent and appropriate   Thought Process:  normal and logical   Associations:  intact   Thought Content:  normal, no suicidality, no homicidality, delusions, or paranoia   Insight:  intact   Judgement: behavior is adequate to circumstances   Orientation:  grossly intact   Memory: intact for content of interview   Language: grossly intact   Attention Span & Concentration:  able to focus   Fund of Knowledge:  intact and appropriate to age and level of education     Assessment and Diagnosis   Status/Progress: Based on the examination today, the patient's problem(s) is/are improved.  New problems have not been presented today.   Lack of compliance are not complicating management of the primary condition.  There are no active rule-out diagnoses for this patient at this " time.     General Impression: Patient is a 29 year old female with a psychiatric history of anxiety and ADHD. Patient reports depression, anxiety controlled with wellbutrin xl 150 mg, lexapro 10 mg, and propanolol 10 mg BID. ADHD symptoms well controlled with Adderall XR 25 mg. Pt is doing well        ICD-10-CM ICD-9-CM    1. Generalized anxiety disorder  F41.1 300.02 EScitalopram oxalate (LEXAPRO) 10 MG tablet      2. ADHD (attention deficit hyperactivity disorder), combined type  F90.2 314.01 dextroamphetamine-amphetamine (ADDERALL XR) 25 MG 24 hr capsule      dextroamphetamine-amphetamine (ADDERALL XR) 25 MG 24 hr capsule      dextroamphetamine-amphetamine (ADDERALL XR) 25 MG 24 hr capsule                  Intervention/Counseling/Treatment Plan   Medication Management: The risks and benefits of medication were discussed with the patient.   Continue wellbutrin xl 150 mg daily for mood, sexual ASE  Continue lexapro 10 mg  Continue Adderall XR 25 mg  Continue propanolol 10 mg BID per PCP  Continue sublocade per outside provider  Discussed diagnosis, risk and benefits of proposed treatment above vs alternative treatment vs no treatment, and potential side effects of these treatments, and the inherent unpredictability of individual responses to these treatments. The patient expresses understanding and gives informed consent to pursue treatment at this time, believing that the potential benefits outweigh the potential risks. Patient has no other questions.   Patient has no contraindications: no h/o allergic rxn, agitation, anxiety, tourette's, arrythmia, cardiovascular disease, cardiac structural abnormalities, hyperthyroidism, glaucoma, Other psychiatric illness, etc.   Discussed diagnosis, risks and benefits of proposed treatment vs alternative treatments vs no treatment, and potential side effects of these treatments (death, dependency, psychosis, tino, aggression, HTN, ticks, MI, stroke, arrythmia, seizure,  anaphylaxis or other allergic reactions, leukopenia, nervousness, anorexia, insomnia, tachycardia, palpitations, dizziness, BP changes, HR changes, visual disturbance, etc.). The patient expresses understanding of the above and displays the capacity to agree with this treatment given said understanding. Patient also agrees that, currently, the benefits outweigh the risks and would like to pursue treatment at this time.  Patient voices understanding and agreement with this plan  Encouraged patient to keep future appointments  Instruct patient to call or message with questions  In the event of an emergency, including suicidal ideation, patient was advised to go to the emergency room      Return to Clinic: 3 months    Sangita Shepard PA-C

## 2025-06-04 ENCOUNTER — NURSE TRIAGE (OUTPATIENT)
Dept: ADMINISTRATIVE | Facility: CLINIC | Age: 30
End: 2025-06-04
Payer: COMMERCIAL

## 2025-06-04 ENCOUNTER — PATIENT MESSAGE (OUTPATIENT)
Dept: PSYCHIATRY | Facility: CLINIC | Age: 30
End: 2025-06-04
Payer: COMMERCIAL

## 2025-06-04 DIAGNOSIS — F90.2 ADHD (ATTENTION DEFICIT HYPERACTIVITY DISORDER), COMBINED TYPE: ICD-10-CM

## 2025-06-04 RX ORDER — DEXTROAMPHETAMINE SACCHARATE, AMPHETAMINE ASPARTATE MONOHYDRATE, DEXTROAMPHETAMINE SULFATE AND AMPHETAMINE SULFATE 6.25; 6.25; 6.25; 6.25 MG/1; MG/1; MG/1; MG/1
25 CAPSULE, EXTENDED RELEASE ORAL EVERY MORNING
Qty: 30 CAPSULE | Refills: 0 | Status: SHIPPED | OUTPATIENT
Start: 2025-06-04 | End: 2025-06-05 | Stop reason: SDUPTHER

## 2025-06-05 ENCOUNTER — PATIENT MESSAGE (OUTPATIENT)
Dept: PSYCHIATRY | Facility: CLINIC | Age: 30
End: 2025-06-05
Payer: COMMERCIAL

## 2025-06-05 DIAGNOSIS — F90.2 ADHD (ATTENTION DEFICIT HYPERACTIVITY DISORDER), COMBINED TYPE: ICD-10-CM

## 2025-06-05 RX ORDER — DEXTROAMPHETAMINE SACCHARATE, AMPHETAMINE ASPARTATE MONOHYDRATE, DEXTROAMPHETAMINE SULFATE AND AMPHETAMINE SULFATE 6.25; 6.25; 6.25; 6.25 MG/1; MG/1; MG/1; MG/1
25 CAPSULE, EXTENDED RELEASE ORAL EVERY MORNING
Qty: 30 CAPSULE | Refills: 0 | Status: SHIPPED | OUTPATIENT
Start: 2025-06-05

## 2025-08-05 ENCOUNTER — PATIENT MESSAGE (OUTPATIENT)
Dept: PSYCHIATRY | Facility: CLINIC | Age: 30
End: 2025-08-05
Payer: OTHER MISCELLANEOUS

## 2025-08-05 DIAGNOSIS — F90.2 ADHD (ATTENTION DEFICIT HYPERACTIVITY DISORDER), COMBINED TYPE: ICD-10-CM

## 2025-08-05 RX ORDER — DEXTROAMPHETAMINE SACCHARATE, AMPHETAMINE ASPARTATE MONOHYDRATE, DEXTROAMPHETAMINE SULFATE AND AMPHETAMINE SULFATE 6.25; 6.25; 6.25; 6.25 MG/1; MG/1; MG/1; MG/1
25 CAPSULE, EXTENDED RELEASE ORAL EVERY MORNING
Qty: 30 CAPSULE | Refills: 0 | Status: SHIPPED | OUTPATIENT
Start: 2025-08-05

## 2025-08-14 ENCOUNTER — OFFICE VISIT (OUTPATIENT)
Dept: PSYCHIATRY | Facility: CLINIC | Age: 30
End: 2025-08-14

## 2025-08-14 DIAGNOSIS — F41.1 GENERALIZED ANXIETY DISORDER: ICD-10-CM

## 2025-08-14 DIAGNOSIS — F90.2 ADHD (ATTENTION DEFICIT HYPERACTIVITY DISORDER), COMBINED TYPE: ICD-10-CM

## 2025-08-14 RX ORDER — ESCITALOPRAM OXALATE 10 MG/1
10 TABLET ORAL DAILY
Qty: 90 TABLET | Refills: 0 | Status: SHIPPED | OUTPATIENT
Start: 2025-08-14 | End: 2025-11-12

## 2025-08-14 RX ORDER — DEXTROAMPHETAMINE SACCHARATE, AMPHETAMINE ASPARTATE MONOHYDRATE, DEXTROAMPHETAMINE SULFATE AND AMPHETAMINE SULFATE 6.25; 6.25; 6.25; 6.25 MG/1; MG/1; MG/1; MG/1
25 CAPSULE, EXTENDED RELEASE ORAL EVERY MORNING
Qty: 30 CAPSULE | Refills: 0 | Status: SHIPPED | OUTPATIENT
Start: 2025-11-05

## 2025-08-14 RX ORDER — DEXTROAMPHETAMINE SACCHARATE, AMPHETAMINE ASPARTATE MONOHYDRATE, DEXTROAMPHETAMINE SULFATE AND AMPHETAMINE SULFATE 6.25; 6.25; 6.25; 6.25 MG/1; MG/1; MG/1; MG/1
25 CAPSULE, EXTENDED RELEASE ORAL EVERY MORNING
Qty: 30 CAPSULE | Refills: 0 | Status: SHIPPED | OUTPATIENT
Start: 2025-10-05

## 2025-08-14 RX ORDER — BUPROPION HYDROCHLORIDE 150 MG/1
150 TABLET ORAL DAILY
Qty: 90 TABLET | Refills: 0 | Status: SHIPPED | OUTPATIENT
Start: 2025-08-14 | End: 2025-11-12

## 2025-08-14 RX ORDER — DEXTROAMPHETAMINE SACCHARATE, AMPHETAMINE ASPARTATE MONOHYDRATE, DEXTROAMPHETAMINE SULFATE AND AMPHETAMINE SULFATE 6.25; 6.25; 6.25; 6.25 MG/1; MG/1; MG/1; MG/1
25 CAPSULE, EXTENDED RELEASE ORAL EVERY MORNING
Qty: 30 CAPSULE | Refills: 0 | Status: SHIPPED | OUTPATIENT
Start: 2025-09-05